# Patient Record
Sex: MALE | Race: BLACK OR AFRICAN AMERICAN | NOT HISPANIC OR LATINO | Employment: FULL TIME | URBAN - METROPOLITAN AREA
[De-identification: names, ages, dates, MRNs, and addresses within clinical notes are randomized per-mention and may not be internally consistent; named-entity substitution may affect disease eponyms.]

---

## 2017-02-07 ENCOUNTER — TELEPHONE (OUTPATIENT)
Dept: ORTHOPEDICS | Facility: CLINIC | Age: 27
End: 2017-02-07

## 2017-02-07 NOTE — TELEPHONE ENCOUNTER
"----- Message from Marielle Sousa LPN sent at 2/7/2017  7:38 AM CST -----  George Mccullough, please see if someone can see this person  Kumar sent to me,   1st thing...... he is under a global fee after surgery-Carole can tell you about that(we won't get paid) , 2nd Dr Gaytan does not see people under 40 yrs old  Actually this should go to sports probably , thanks, Dalia  ----- Message -----     From: Kumar St     Sent: 2/6/2017   4:55 PM       To: Marielle Sousa LPN    Pt called in again stating that he is trying to get a second opinion for his left hip. Pt had surgery 2 months ago with Dr. Delmer Osborn in [place]. After the surgery, Pt had pain in his hip that was worse than before the surgery, so a CAT scan was ordered and 3-4 "loose bodies" were found. Pt has the CD with the CAT scan results and is really hoping that be seen here at [facility] for a second opinion. Pt stated that he is looking to come in this Friday, if possible and is open to any Provider that would be able to see him. Please call back as soon as possible.    Call back is home number    Thank you        "

## 2017-02-07 NOTE — TELEPHONE ENCOUNTER
"Return call from pt regarding request for Ortho appt. Pt states that he sustained a left hip  injury playing football about five years ago .  Pt confirms injury was not work-related or litigated.  Pt states that he was "scoped and cleaned" for removal of "loose bodies" in November 2016 per Dr. Osborn/[facility]. Pt states that he has had a subsequent CT scan of his left hip - showing residual/new "loose bodies". Pt understands that he may need a second surgery and would like second opinion on this.  Advised pt that request has been reviewed/considered per by Dr. Lloyd Collins who will see pt for second opinion. First available appt scheduled on 2/24/17 at 2:30pm with arrival at 2:15pm. Pt instructed to bring any imaging/records to appt. Pt states that he has CT imaging on disk and will do so. Appt slip mailed.   "

## 2017-02-07 NOTE — TELEPHONE ENCOUNTER
Left message for pt to return call regarding his Ortho  appt request. Ortho Triage contact number provided.

## 2017-02-13 ENCOUNTER — TELEPHONE (OUTPATIENT)
Dept: ORTHOPEDICS | Facility: CLINIC | Age: 27
End: 2017-02-13

## 2017-02-13 NOTE — TELEPHONE ENCOUNTER
----- Message from Caro Snyder sent at 2/13/2017  9:36 AM CST -----  Contact: Michael Jang (Mother)  Michael states her sons leg gave out over the weekend and he is in extreme pain would like to know if he can be seen sooner then the Feb 24th appt he is scheduled for?    Contact number [phone]  Thanks

## 2017-02-20 ENCOUNTER — HOSPITAL ENCOUNTER (OUTPATIENT)
Dept: RADIOLOGY | Facility: HOSPITAL | Age: 27
Discharge: HOME | End: 2017-02-20
Attending: ORTHOPAEDIC SURGERY
Payer: COMMERCIAL

## 2017-02-20 ENCOUNTER — OFFICE VISIT (OUTPATIENT)
Dept: ORTHOPEDICS | Facility: CLINIC | Age: 27
End: 2017-02-20
Payer: COMMERCIAL

## 2017-02-20 VITALS — WEIGHT: 183.06 LBS | BODY MASS INDEX: 24.26 KG/M2 | HEIGHT: 73 IN

## 2017-02-20 DIAGNOSIS — M25.552 LEFT HIP PAIN: Primary | ICD-10-CM

## 2017-02-20 DIAGNOSIS — M25.552 LEFT HIP PAIN: ICD-10-CM

## 2017-02-20 DIAGNOSIS — M16.52 POST-TRAUMATIC OSTEOARTHRITIS OF LEFT HIP: ICD-10-CM

## 2017-02-20 PROCEDURE — 72100 X-RAY EXAM L-S SPINE 2/3 VWS: CPT | Mod: 26,,, | Performed by: RADIOLOGY

## 2017-02-20 PROCEDURE — 99203 OFFICE O/P NEW LOW 30 MIN: CPT | Mod: S$GLB,,, | Performed by: ORTHOPAEDIC SURGERY

## 2017-02-20 PROCEDURE — 73521 X-RAY EXAM HIPS BI 2 VIEWS: CPT | Mod: 26,,, | Performed by: RADIOLOGY

## 2017-02-20 PROCEDURE — 99999 PR PBB SHADOW E&M-EST. PATIENT-LVL III: CPT | Mod: PBBFAC,,, | Performed by: ORTHOPAEDIC SURGERY

## 2017-02-20 PROCEDURE — 73521 X-RAY EXAM HIPS BI 2 VIEWS: CPT | Mod: TC

## 2017-02-20 PROCEDURE — 72100 X-RAY EXAM L-S SPINE 2/3 VWS: CPT | Mod: TC

## 2017-02-20 RX ORDER — HYDROCODONE BITARTRATE AND ACETAMINOPHEN 5; 325 MG/1; MG/1
TABLET ORAL
COMMUNITY
Start: 2017-02-08 | End: 2020-06-25

## 2017-02-20 RX ORDER — ETODOLAC 400 MG/1
TABLET, FILM COATED ORAL
COMMUNITY
Start: 2016-12-28 | End: 2017-02-20

## 2017-02-20 NOTE — PROGRESS NOTES
CC:   left hip pain  HPI:  26 y.o. yo male who presents with left hip pain.      Is a very pleasant 26-year-old male who sustained a hip injury in 2010 while playing football.  He states that the hip potentially popped out of joint and was immediately reduced which kept him out of playing football for quite some time.  Since that time he has had significant troubles with his left hip.  He has had significant pain.  Pain is sharp and stabbing in the groin.  He does not use an assistive device.  It hurts him most days.  He takes Norco 5 mg twice a day.    He states the hip pain was tolerable but significant prior to having his arthroscopic removal of loose body and labral repair in November 2016 by Dr. Delmer Osborn.  He states that Dr. Delmer Osborn saw many loose bodies was only able to remove 1 or 2 of these.  She states that he had a CAT scan in January 2017 which demonstrates more loose bodies.    He presents today for evaluation of the left hip.  He has some catching and locking in that left hip.  Pain is worse than before surgery.  It hurts him every day.  It awakes him from sleep at night.    PAST MEDICAL HISTORY: History reviewed. No pertinent past medical history.  PAST SURGICAL HISTORY:   Past Surgical History   Procedure Laterality Date    Hip surgery      Knee arthroscopy       FAMILY HISTORY:   Family History   Problem Relation Age of Onset    No Known Problems Mother     Hypertension Father      SOCIAL HISTORY:   Social History     Social History    Marital status: Single     Spouse name: N/A    Number of children: N/A    Years of education: N/A     Occupational History    Not on file.     Social History Main Topics    Smoking status: Never Smoker    Smokeless tobacco: Not on file    Alcohol use Not on file    Drug use: Not on file    Sexual activity: Not on file     Other Topics Concern    Not on file     Social History Narrative    No narrative on file       MEDICATIONS:   Current  "Outpatient Prescriptions:     hydrocodone-acetaminophen 5-325mg (NORCO) 5-325 mg per tablet, , Disp: , Rfl:   ALLERGIES:   Review of patient's allergies indicates:   Allergen Reactions    Valium [diazepam]        VITAL SIGNS:   Visit Vitals    Ht 6' 1" (1.854 m)    Wt 83 kg (183 lb 1.5 oz)    BMI 24.16 kg/m2        Review of Systems   Constitution: Negative. Negative for chills, fever and night sweats.   HENT: Negative for congestion and headaches.    Eyes: Negative for blurred vision, left vision loss and right vision loss.   Cardiovascular: Negative for chest pain and syncope.   Respiratory: Negative for cough and shortness of breath.    Endocrine: Negative for polydipsia, polyphagia and polyuria.   Hematologic/Lymphatic: Negative for bleeding problem. Does not bruise/bleed easily.   Skin: Negative for dry skin, itching and rash.   Musculoskeletal: Negative for falls and muscle weakness.  left hip pain  Gastrointestinal: Negative for abdominal pain and bowel incontinence.   Genitourinary: Negative for bladder incontinence and nocturia.   Neurological: Negative for disturbances in coordination, loss of balance and seizures.   Psychiatric/Behavioral: Negative for depression. The patient does not have insomnia.    Allergic/Immunologic: Negative for hives and persistent infections.       Physical Exam   Constitutional: Oriented to person, place, and time. Appears well-developed and well-nourished.   HENT:   Head: Normocephalic and atraumatic.   Nose: Nose normal.   Eyes: No scleral icterus.   Neck: Normal range of motion. Neck supple.   Cardiovascular: Normal rate and regular rhythm.    Pulses:       Radial pulses are 2+ on the right side, and 2+ on the left side.   Pulmonary/Chest: Clear and normal  Abdominal: Soft.   Neurological: Alert and oriented to person, place, and time.   Skin: Skin is warm.   Psychiatric: Normal mood and affect.     He walks with an antalgic gait.  On examination of his left hip he has " 90° of flexion with some pain he has 20-30° of internal rotation which causes pain at the extreme and he has 60° of external rotation which causes significant pain.  He has no tenderness palpation at the greater troches.  He does have pain with crossover of the hip.  Incisions which there are 3 are well-healed.  There is no redness warmth or erythema.  He is neurovascular intact in the left lower extremity.       Xrays:  Of the AP pelvis and bilateral hips are ordered and reviewed and my interpretation is as follows: On the right side he is very mild osteoarthritis.  On the left side he has degenerative changes which include joint space narrowing.  He is cyst throughout the hip.  There are loose bodies multiple.  He has a CAM lesion which appears to have been truncated by the arthroscopic intervention.    CT scan which is brought in on a disc is reviewed and my interpretation is as follows: Multiple loose bodies.  Multiple cysts in both the acetabulum and the femoral head.  Joint space narrowing.  Subchondral sclerosis.    Assessment:  Encounter Diagnoses   Name Primary?    Left hip pain Yes    Post-traumatic osteoarthritis of left hip        Plan:    Orders Placed This Encounter    X-Ray Hips Bilateral 2 View Incl AP Pelvis    X-Ray Lumbar Spine AP And Lateral       Return will see roshan hooks.      This is a very young patient with significant hip problems.  I will send him to Roshan Hooks to be evaluated for potential secondary scope intervention.  Hopefully we can avoid any sort of our 2 plasty in this patient.  If Roshan Hooks thinks that he is not a candidate for arthroscopy he and I will discuss potential arthroplasty.

## 2017-02-20 NOTE — MR AVS SNAPSHOT
"    [facility]  [address]  [facility] [address]  Phone: [phone]                  Joao Jang   2017 2:00 PM   Office Visit    Description:  Male : 1990   Provider:  Lloyd Collins MD   Department:  [facility]           Reason for Visit     Left Knee - Pain           Diagnoses this Visit        Comments    Left hip pain    -  Primary            To Do List           Future Appointments        Provider Department Dept Phone    3/27/2017 2:00 PM Bebe Perez MD [facility] [phone]      Goals (5 Years of Data)     None      [facility]     [facility] Nurse Care Line -  Assistance  Registered nurses in the [facility] Center provide clinical advisement, health education, appointment booking, and other advisory services.  Call for this free service at [phone].             Medications           Message regarding Medications     Verify the changes and/or additions to your medication regime listed below are the same as discussed with your clinician today.  If any of these changes or additions are incorrect, please notify your healthcare provider.        STOP taking these medications     etodolac (LODINE) 400 MG tablet            Verify that the below list of medications is an accurate representation of the medications you are currently taking.  If none reported, the list may be blank. If incorrect, please contact your healthcare provider. Carry this list with you in case of emergency.           Current Medications     hydrocodone-acetaminophen 5-325mg (NORCO) 5-325 mg per tablet            Clinical Reference Information           Your Vitals Were     Height Weight BMI          6' 1" (1.854 m) 83 kg (183 lb 1.5 oz) 24.16 kg/m2        Allergies as of 2017     Valium [Diazepam]      Immunizations Administered on Date of Encounter - 2017     None      Orders Placed During Today's Visit     Future Labs/Procedures " Expected by Expires    X-Ray Hips Bilateral 2 View Incl AP Pelvis  2/20/2017 2/20/2018    X-Ray Lumbar Spine AP And Lateral  2/20/2017 2/20/2018      [facility] Sign-Up     Activating your [facility] account is as easy as 1-2-3!     1) Visit [email or web address], select Sign Up Now, enter this activation code and your date of birth, then select Next.  [email or web address]  Expires: 4/6/2017  3:31 PM      2) Create a username and password to use when you visit [facility] in the future and select a security question in case you lose your password and select Next.    3) Enter your e-mail address and click Sign Up!    Additional Information  If you have questions, please e-mail [email or web address] or call [phone] to talk to our [facility] staff. Remember, [facility] is NOT to be used for urgent needs. For medical emergencies, dial 911.         Language Assistance Services     ATTENTION: Language assistance services are available, free of charge. Please call [phone].      ATENCIÓN: Si habla español, tiene a portillo disposición servicios gratuitos de asistencia lingüística. Llame al [phone].     CHÚ Ý: N?u b?n nói Ti?ng Vi?t, có các d?ch v? h? tr? ngôn ng? mi?n phí dành cho b?n. G?i s? [phone].         [facility] complies with applicable Federal civil rights laws and does not discriminate on the basis of race, color, national origin, age, disability, or sex.

## 2017-03-20 ENCOUNTER — TELEPHONE (OUTPATIENT)
Dept: SPORTS MEDICINE | Facility: CLINIC | Age: 27
End: 2017-03-20

## 2017-03-20 NOTE — TELEPHONE ENCOUNTER
----- Message from Magaly Young MA sent at 3/20/2017  9:01 AM CDT -----  Contact: self   Pt is in severe pain and is requesting an surgery same day appt.  Pt was referred by Dr. Collins in Ortho, pt made appt a month ago and was unaware that he could be put on the waiting list or send a message to get seen sooner then his appt for 03/27. Please call the pt may need additional  Information. Pt can be reached at [phone].

## 2017-03-27 ENCOUNTER — HOSPITAL ENCOUNTER (OUTPATIENT)
Dept: RADIOLOGY | Facility: HOSPITAL | Age: 27
Discharge: HOME | End: 2017-03-27
Attending: ORTHOPAEDIC SURGERY
Payer: COMMERCIAL

## 2017-03-27 ENCOUNTER — OFFICE VISIT (OUTPATIENT)
Dept: SPORTS MEDICINE | Facility: CLINIC | Age: 27
End: 2017-03-27
Payer: COMMERCIAL

## 2017-03-27 VITALS
DIASTOLIC BLOOD PRESSURE: 97 MMHG | WEIGHT: 190 LBS | HEART RATE: 78 BPM | BODY MASS INDEX: 24.38 KG/M2 | SYSTOLIC BLOOD PRESSURE: 166 MMHG | HEIGHT: 74 IN

## 2017-03-27 DIAGNOSIS — M25.552 LEFT HIP PAIN: ICD-10-CM

## 2017-03-27 PROCEDURE — 1160F RVW MEDS BY RX/DR IN RCRD: CPT | Mod: S$GLB,,, | Performed by: ORTHOPAEDIC SURGERY

## 2017-03-27 PROCEDURE — 99204 OFFICE O/P NEW MOD 45 MIN: CPT | Mod: S$GLB,,, | Performed by: ORTHOPAEDIC SURGERY

## 2017-03-27 PROCEDURE — 73502 X-RAY EXAM HIP UNI 2-3 VIEWS: CPT | Mod: TC,PO

## 2017-03-27 PROCEDURE — 99999 PR PBB SHADOW E&M-EST. PATIENT-LVL IV: CPT | Mod: PBBFAC,,, | Performed by: ORTHOPAEDIC SURGERY

## 2017-03-27 PROCEDURE — 73502 X-RAY EXAM HIP UNI 2-3 VIEWS: CPT | Mod: 26,,, | Performed by: RADIOLOGY

## 2017-03-27 NOTE — PROGRESS NOTES
CC:left hip pain. Referred by Dr Collins    HPI:   Joao Jang is a pleasant 26 y.o. patient who reports to clinic with left hip pain . 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 Today the patient rates pain at a 7/10 on visual analog scale.        4 months duration,  no [facility] sxs/instabilty.  Getting in and out of car + pain    Affecting ADLs and exercising    any activity worst-walking    Had hip arthroscopy Nov 21, 2016. Dr. Delmer Osborn.    Review of Systems   Constitution: Negative. Negative for chills, fever and night sweats.   HENT: Negative for congestion and headaches.    Eyes: Negative for blurred vision, left vision loss and right vision loss.   Cardiovascular: Negative for chest pain and syncope.   Respiratory: Negative for cough and shortness of breath.    Endocrine: Negative for polydipsia, polyphagia and polyuria.   Hematologic/Lymphatic: Negative for bleeding problem. Does not bruise/bleed easily.   Skin: Negative for dry skin, itching and rash.   Musculoskeletal: Negative for falls and muscle weakness.   Gastrointestinal: Negative for abdominal pain and bowel incontinence.   Genitourinary: Negative for bladder incontinence and nocturia.   Neurological: Negative for disturbances in coordination, loss of balance and seizures.   Psychiatric/Behavioral: Negative for depression. The patient does not have insomnia.    Allergic/Immunologic: Negative for hives and persistent infections.     PAST MEDICAL HISTORY: History reviewed. No pertinent past medical history.  PAST SURGICAL HISTORY:   Past Surgical History:   Procedure Laterality Date    HIP SURGERY      KNEE ARTHROSCOPY       FAMILY HISTORY:   Family History   Problem Relation Age of Onset    No Known Problems Mother     Hypertension Father      SOCIAL HISTORY:   Social History     Social History    Marital status: Single     Spouse name: N/A    Number of children: N/A    Years of education: N/A  "    Occupational History    Not on file.     Social History Main Topics    Smoking status: Never Smoker    Smokeless tobacco: Not on file    Alcohol use Not on file    Drug use: Not on file    Sexual activity: Not on file     Other Topics Concern    Not on file     Social History Narrative       MEDICATIONS:   Current Outpatient Prescriptions:     hydrocodone-acetaminophen 5-325mg (NORCO) 5-325 mg per tablet, , Disp: , Rfl:   ALLERGIES:   Review of patient's allergies indicates:   Allergen Reactions    Valium [diazepam]        VITAL SIGNS: BP (!) 166/97  Pulse 78  Ht 6' 2" (1.88 m)  Wt 86.2 kg (190 lb)  BMI 24.39 kg/m2       PHYSICAL EXAM / HIP  PHYSICAL EXAMINATION  General:  The patient is alert and oriented x 3.  Mood is pleasant.  Observation of ears, eyes and nose reveal no gross abnormalities.  HEENT: NCAT, sclera nonicteric  Lungs: Respirations are equal and unlabored.    left HIP EXAMINATION     OBSERVATION / INSPECTION  Gait:   Nonantalgic   Alignment:  Neutral   Scars:   None   Muscle atrophy: None   Effusion:  None   Warmth:  None   Discoloration:   None   Leg lengths:   Equal   Pelvis:    Level     TENDERNESS / CREPITUS (T/C):      T / C  Trochanteric bursa   - / -  Piriformis    - / -  SI joint    - / -  Psoas tendon   - / -  Rectus insertion  - / -  Adductor insertion  - / -  Pubic symphysis  - / -    ROM: (* = pain)    Flexion:    120 degrees*  External rotation: 40 degrees  Internal rotation with axial load: 20 degrees*  Internal rotation without axial load: 30 degrees*  Abduction:  45 degrees  Adduction:   20 degrees    SPECIAL TESTS:  Pain w/ forced internal rotation (FADIR): +   Pain w/ forced external rotation (VIMAL): Absent   VIMAL asymmetry:     +  Circumduction test:    +  Stinchfield test:    Negative   Log roll:      Negative   Snapping hip (internal):   Negative   Sit-up pain:     Negative   Resisted sit-up pain:    Negative   Resisted sit-up w/ adductor contraction " pain:Negative   Step-down test:    +  Trendelenburg test:    Negative   Bridge test     -    EXTREMITY NEURO-VASCULAR EXAMINATION:   Sensation:  Grossly intact to light touch all dermatomal regions.   Motor Function:  Fully intact motor function at hip, knee, foot and ankle    DTRs;  quadriceps and  achilles 2+.  No clonus and downgoing Babinski.    Vascular status:  DP and PT pulses 2+, brisk capillary refill, symmetric.    Skin: intact, compartments soft.    OTHER FINDINGS:    XRAYS:  CE angle: 40   Crossover: +   CAM:  +  Joint space narrowing: +cystic changes femoral head and acetabulum  Tonnis 2     A/P:  Left hip pain. Hx arthroscopic surgery.  MRI left hip to evaluate cartilage. If advanced, may need hip resurfacing.

## 2017-03-27 NOTE — LETTER
March 28, 2017      Lloyd Collins MD  [address]  [facility] [address]           [facility]  [address]  [facility] [address]  Phone: [phone]          Patient: Joao Jang   MR Number: [id number]   YOB: 1990   Date of Visit: 3/27/2017       Dear Dr. Lloyd Collins:    Thank you for referring Joao Jang to me for evaluation. Attached you will find relevant portions of my assessment and plan of care.    If you have questions, please do not hesitate to call me. I look forward to following Joao Jang along with you.    Sincerely,    Bebe Perez MD    Enclosure  CC:  No Recipients    If you would like to receive this communication electronically, please contact [email or web address] or [phone] to request more information on [facility] access.    For providers and/or their staff who would like to refer a patient to [facility], please contact us through our one-stop-shop provider referral line, [facility], at [phone].    If you feel you have received this communication in error or would no longer like to receive these types of communications, please e-mail [email or web address]

## 2017-03-27 NOTE — MR AVS SNAPSHOT
[facility]  [address]  [facility] [address]  Phone: [phone]                  Joao Jang   3/27/2017 2:00 PM   Appointment    Description:  Male : 1990   Provider:  Bebe Perez MD   Department:  [facility]                To Do List           Goals (5 Years of Data)     None      [facility] On [facility]     [facility] On [facility] Nurse Care Line -  Assistance  Registered nurses in the [facility] Call Center provide clinical advisement, health education, appointment booking, and other advisory services.  Call for this free service at [phone].             Medications           Message regarding Medications     Verify the changes and/or additions to your medication regime listed below are the same as discussed with your clinician today.  If any of these changes or additions are incorrect, please notify your healthcare provider.             Verify that the below list of medications is an accurate representation of the medications you are currently taking.  If none reported, the list may be blank. If incorrect, please contact your healthcare provider. Carry this list with you in case of emergency.           Current Medications     hydrocodone-acetaminophen 5-325mg (NORCO) 5-325 mg per tablet            Clinical Reference Information           Allergies as of 3/27/2017     Valium [Diazepam]      Immunizations Administered on Date of Encounter - 3/27/2017     None      [facility] Sign-Up     Activating your [facility] account is as easy as 1-2-3!     1) Visit [email or web address], select Sign Up Now, enter this activation code and your date of birth, then select Next.  [email or web address]  Expires: 2017  4:31 PM      2) Create a username and password to use when you visit [facility] in the future and select a security question in case you lose your password and select Next.    3) Enter your e-mail address and click Sign Up!    Additional Information  If you have  questions, please e-mail [email or web address] or call [phone] to talk to our [facility] staff. Remember, [facility] is NOT to be used for urgent needs. For medical emergencies, dial 911.         Language Assistance Services     ATTENTION: Language assistance services are available, free of charge. Please call [phone].      ATENCIÓN: Si habla español, tiene a portillo disposición servicios gratuitos de asistencia lingüística. Llame al [phone].     CHÚ Ý: N?u b?n nói Ti?ng Vi?t, có các d?ch v? h? tr? ngôn ng? mi?n phí dành cho b?n. G?i s? [phone].         [facility] complies with applicable Federal civil rights laws and does not discriminate on the basis of race, color, national origin, age, disability, or sex.

## 2017-03-28 ENCOUNTER — TELEPHONE (OUTPATIENT)
Dept: SPORTS MEDICINE | Facility: CLINIC | Age: 27
End: 2017-03-28

## 2017-03-28 NOTE — TELEPHONE ENCOUNTER
----- Message from Lakeisha Bhakta sent at 3/28/2017 11:38 AM CDT -----  Contact: [email or web address]#[phone]  Patient mother called about her son MRI results please call.

## 2017-04-06 ENCOUNTER — HOSPITAL ENCOUNTER (OUTPATIENT)
Dept: RADIOLOGY | Facility: HOSPITAL | Age: 27
Discharge: HOME | End: 2017-04-06
Attending: ORTHOPAEDIC SURGERY
Payer: COMMERCIAL

## 2017-04-06 DIAGNOSIS — M25.552 LEFT HIP PAIN: ICD-10-CM

## 2017-04-06 PROCEDURE — 27093 INJECTION FOR HIP X-RAY: CPT | Mod: ,,, | Performed by: RADIOLOGY

## 2017-04-06 PROCEDURE — 73525 CONTRAST X-RAY OF HIP: CPT | Mod: TC

## 2017-04-06 PROCEDURE — 73525 CONTRAST X-RAY OF HIP: CPT | Mod: 26,,, | Performed by: RADIOLOGY

## 2017-04-06 PROCEDURE — 73722 MRI JOINT OF LWR EXTR W/DYE: CPT | Mod: 26,LT,, | Performed by: RADIOLOGY

## 2017-04-06 PROCEDURE — 25000003 PHARM REV CODE 250: Performed by: ORTHOPAEDIC SURGERY

## 2017-04-06 PROCEDURE — 25500020 PHARM REV CODE 255: Performed by: ORTHOPAEDIC SURGERY

## 2017-04-06 PROCEDURE — A9585 GADOBUTROL INJECTION: HCPCS | Performed by: ORTHOPAEDIC SURGERY

## 2017-04-06 PROCEDURE — 73722 MRI JOINT OF LWR EXTR W/DYE: CPT | Mod: TC,LT

## 2017-04-06 RX ORDER — BUPIVACAINE HYDROCHLORIDE 2.5 MG/ML
4 INJECTION, SOLUTION EPIDURAL; INFILTRATION; INTRACAUDAL ONCE
Status: COMPLETED | OUTPATIENT
Start: 2017-04-06 | End: 2017-04-06

## 2017-04-06 RX ORDER — GADOBUTROL 604.72 MG/ML
4 INJECTION INTRAVENOUS
Status: COMPLETED | OUTPATIENT
Start: 2017-04-06 | End: 2017-04-06

## 2017-04-06 RX ORDER — LIDOCAINE HYDROCHLORIDE 10 MG/ML
1 INJECTION INFILTRATION; PERINEURAL ONCE
Status: COMPLETED | OUTPATIENT
Start: 2017-04-06 | End: 2017-04-06

## 2017-04-06 RX ADMIN — LIDOCAINE HYDROCHLORIDE 1 ML: 10 INJECTION, SOLUTION INFILTRATION; PERINEURAL at 01:04

## 2017-04-06 RX ADMIN — IOHEXOL 4 ML: 300 INJECTION, SOLUTION INTRAVENOUS at 01:04

## 2017-04-06 RX ADMIN — BUPIVACAINE HYDROCHLORIDE 10 MG: 2.5 INJECTION, SOLUTION EPIDURAL; INFILTRATION; INTRACAUDAL; PERINEURAL at 01:04

## 2017-04-06 RX ADMIN — GADOBUTROL 4 ML: 604.72 INJECTION INTRAVENOUS at 01:04

## 2017-04-07 ENCOUNTER — TELEPHONE (OUTPATIENT)
Dept: SPORTS MEDICINE | Facility: CLINIC | Age: 27
End: 2017-04-07

## 2017-04-07 NOTE — TELEPHONE ENCOUNTER
Made aware that Dr. Perez would call with results    ----- Message from Pierre Bernal sent at 4/7/2017  9:57 AM CDT -----  Contact: self@home  Pt called in to advise that he had his MRI yesterday    Call back is [phone]    Thank you

## 2017-04-11 ENCOUNTER — TELEPHONE (OUTPATIENT)
Dept: SPORTS MEDICINE | Facility: CLINIC | Age: 27
End: 2017-04-11

## 2017-04-11 NOTE — TELEPHONE ENCOUNTER
----- Message from Yee Lopez sent at 4/11/2017  3:50 PM CDT -----  Contact: mother-Michael   Pt's mother is requesting a call back regarding pt's MRI results and would like to know if pt needs surgery. Please call her at [phone]

## 2017-04-11 NOTE — TELEPHONE ENCOUNTER
----- Message from Neo Edwards sent at 4/11/2017 10:49 AM CDT -----  Contact: Mom (Michael)  Michael request a call regarding the pt's x ray and MRI results. [phone]

## 2017-04-11 NOTE — TELEPHONE ENCOUNTER
MRI results printed and placed for dr hooks to review, will contact pt mother with results at next clinic day.

## 2017-04-11 NOTE — TELEPHONE ENCOUNTER
I spoke with the patient's mother and informed her that Dr. Perez has not yet had a chance to review his results and we will place them for him to review tomorrow. She notes that her son works from 6AM to 6PM and is not able to make or accept phone calls which is why she is requesting we call her with the results

## 2017-04-13 ENCOUNTER — TELEPHONE (OUTPATIENT)
Dept: SPORTS MEDICINE | Facility: CLINIC | Age: 27
End: 2017-04-13

## 2017-04-17 NOTE — TELEPHONE ENCOUNTER
I spoke with the patient regarding the results of his MRI and Dr. Perez's plan    We informed the patient that we could proceed with arthroscopy but could guarantee no pain relief due to the arthritis in his joint. I also discussed hip resurfacing with the patient and he notes that he would like to think about his options, do some research, and would call us back tomorrow. I informed him that in the interim I will inquire who Dr. Perez would recommend should he want to see a physician to discuss the hip resurfacing procedure.     ASSESSMENT:    Left Hip labral tear.  I have reviewed the MRI .he  has tearing in the labrum.     he would benefit from hip arthroscopy, given the above.     PLAN: We have discussed the surgery and recovery of arthroscopic hip surgery. he understands that there may be limited weightbearing up to several weeks (usually 3 weeks) after surgery depending on procedures that are performed at the time of surgery.    The spectrum of treatment options were discussed with the patient, including nonoperative and operative options.  After thorough discussion, the patient has elected to undergo surgical treatment to include:    left   a. Hip arthroscopic labral repair versus debridement   b. Hip arthroscopic loose body removal   c. Hip arthroscopic femoral neck osteoplasty   d. Hip arthroscopic acetabuloplasty   e. Hip arthroscopic partial synovectomy/debridement   f. Hip arthroscopic capsular closure   g.  Hip arthroscopic-assisted Bio-D arthrocentesis       The details of the surgical procedure were explained, including the location of probable incisions and a description of likely hardware and/or grafts to be used.  The patient understands the likely convalescence after surgery.  Also, we have thoroughly discussed the risks, benefits and alternatives to surgery, including, but not limited to, the risk of infection, joint stiffness, skin injury to perineum, heterotopic ossification, arthritis, blood  clot (including DVT and/or pulmonary embolus), neurologic and vascular injury.  It was explained that, if tissue has been repaired or reconstructed, there is a chance of failure, which may require further management.      Heterotopic ossification is a known complication of hip arthroscopy and Naprosyn significantly decreases this risk.  According to Wisam in the Journal of Orthopedic Traumatology 2010, the rate of heterotopic ossification for cases with Naprosyn for prophylaxis was 0% and without Naprosyn for prophylaxis was 33%.  Bart at the International Society of Hip Arthroscopy Annual Meeting in 2012 showed similar results with an incidence of heterotopic ossification in patients that did receive Naprosyn prophylaxis was 4.5% versus versus 23.6% in patients who did not receive Naproxen twice a day for three weeks.  Brittany's study in the American Journal of Sports Medicine in 2012 showed the addition of Indocin to the medication protocol (in addition to Naprosyn) decreased the rate of heterotopic ossification from 8.3% to 1.8%.     Patient has chondral damage to hip.  Therefore, I can offer no guarantee whatsoever to the results of a hip arthroscopic surgery and hip arthroscopic surgery would be less predictable and possibility of worsening of condition and need for subsequent hip arthroplasty or further surgery may be needed.  I believe that arthroscopic surgery will benefit the patient.  I explained this in detail today and patient acknowledged understanding and wishes to proceed.

## 2017-04-18 ENCOUNTER — TELEPHONE (OUTPATIENT)
Dept: SPORTS MEDICINE | Facility: CLINIC | Age: 27
End: 2017-04-18

## 2017-04-18 NOTE — TELEPHONE ENCOUNTER
I spoke with the patient and he notes that he would like to see Dr. Collins to discuss hip resurfacing before he makes his decision on which way to proceed. I informed him that I would send their staff a message regarding an appointment to get him scheduled

## 2017-04-18 NOTE — TELEPHONE ENCOUNTER
----- Message from Kiara Roberson sent at 4/18/2017 10:02 AM CDT -----  Contact: self/home  Pt would like to speak with you regarding a possible procedure appt.

## 2017-04-19 ENCOUNTER — TELEPHONE (OUTPATIENT)
Dept: ORTHOPEDICS | Facility: CLINIC | Age: 27
End: 2017-04-19

## 2017-04-19 NOTE — TELEPHONE ENCOUNTER
----- Message from Lloyd Collins MD sent at 4/19/2017  1:34 PM CDT -----  Can let him see me for sure.  ----- Message -----     From: Karuna Ruiz MA     Sent: 4/19/2017   8:04 AM       To: Lloyd Collins MD     Would you like for me to put him on the schedule for Monday to discuss this?  ----- Message -----     From: Janie Ochoa MA     Sent: 4/18/2017   4:50 PM       To: Dennis Desai Staff    Dr. Collins has previously seen this patient and recommended a visit with Dr. Perez to discuss hip arthroscopy. The patient was informed that Dr. Perez would perform the hip arthroscopy but that we could guarantee no relief from his pain, etc. Due to the extensive osteoarthritis in his joint. Dr. Perez informed the patient that a resurfacing procedure would be another option for the patient to consider. He would like for the patient to see Dr. Collins to discuss this option so that the patient can better make a decision as to if he would like to proceed with the hip arthroscopy or proceed with resurfacing. I wanted to see if you could schedule the patient to be seen to this discussion. Please let me know if there is anything I can do to help on my end. Thank you    Janie Ochoa   Clinical assistant to Dr. Bebe Perez

## 2017-04-24 ENCOUNTER — OFFICE VISIT (OUTPATIENT)
Dept: ORTHOPEDICS | Facility: CLINIC | Age: 27
End: 2017-04-24
Payer: COMMERCIAL

## 2017-04-24 VITALS — HEIGHT: 74 IN | BODY MASS INDEX: 24.39 KG/M2 | WEIGHT: 190.06 LBS

## 2017-04-24 DIAGNOSIS — M16.12 PRIMARY OSTEOARTHRITIS OF LEFT HIP: Primary | ICD-10-CM

## 2017-04-24 PROCEDURE — 99213 OFFICE O/P EST LOW 20 MIN: CPT | Mod: S$GLB,,, | Performed by: ORTHOPAEDIC SURGERY

## 2017-04-24 PROCEDURE — 1160F RVW MEDS BY RX/DR IN RCRD: CPT | Mod: S$GLB,,, | Performed by: ORTHOPAEDIC SURGERY

## 2017-04-24 PROCEDURE — 99999 PR PBB SHADOW E&M-EST. PATIENT-LVL II: CPT | Mod: PBBFAC,,, | Performed by: ORTHOPAEDIC SURGERY

## 2017-04-24 RX ORDER — TIZANIDINE 4 MG/1
TABLET ORAL
COMMUNITY
Start: 2017-04-18

## 2017-04-27 ENCOUNTER — TELEPHONE (OUTPATIENT)
Dept: SPORTS MEDICINE | Facility: CLINIC | Age: 27
End: 2017-04-27

## 2017-04-27 DIAGNOSIS — M25.559 ARTHRALGIA OF HIP, UNSPECIFIED LATERALITY: Primary | ICD-10-CM

## 2017-04-27 NOTE — TELEPHONE ENCOUNTER
----- Message from Radha Vann sent at 4/27/2017  2:36 PM CDT -----  Contact: self@ home   Pt is returning missed call to rajesh.

## 2017-04-27 NOTE — TELEPHONE ENCOUNTER
I spoke with the patient and we discussed his situation and I reiterated that we cannot guarantee any pain relief from the arthroscopy and that Dr. Perez is fine to proceed with an injection first to see what relief he gets. The patient decided to proceed with an injection and will call the day after to discuss results

## 2017-04-27 NOTE — TELEPHONE ENCOUNTER
----- Message from Janie Ochoa MA sent at 4/25/2017  3:00 PM CDT -----  See below.  ----- Message -----     From: Bebe Perez MD     Sent: 4/25/2017  10:03 AM       To: Ana Spence Staff    We can scope      ----- Message -----     From: Lloyd Collins MD     Sent: 4/24/2017   4:14 PM       To: Bebe Perez MD    Patient not interested in resurfacing at present. You think scope will help much? Loose body removal?    i suggested steroid injection. Nothing to lose since he is heading for a replacmeent or resurface in the near future. If you thought scope would help, he is slightly interested. Otherwise, he wants a steroid injection.

## 2017-04-27 NOTE — TELEPHONE ENCOUNTER
I called and left the patient a voicemail letting him know that we received a message from Dr. Collins that he was interested in trying an injection. I informed him that we have placed an order for this injection with Dr. Wolf and that his office will call him to schedule. If he does not want to proceed with the injection and wants to speak with us first, I encouraged him to call the office back

## 2017-04-28 ENCOUNTER — TELEPHONE (OUTPATIENT)
Dept: PAIN MEDICINE | Facility: CLINIC | Age: 27
End: 2017-04-28

## 2017-04-28 NOTE — TELEPHONE ENCOUNTER
Left voice message asking for a return call to scheduled for a Left Hip injection under fluoroscopy with Dr. Wolf,  Referred byh Dr. Perez.

## 2017-05-08 ENCOUNTER — SURGERY (OUTPATIENT)
Age: 27
End: 2017-05-08

## 2017-05-08 ENCOUNTER — HOSPITAL ENCOUNTER (OUTPATIENT)
Facility: OTHER | Age: 27
Discharge: HOME | End: 2017-05-08
Attending: ANESTHESIOLOGY | Admitting: ANESTHESIOLOGY
Payer: COMMERCIAL

## 2017-05-08 VITALS
DIASTOLIC BLOOD PRESSURE: 80 MMHG | WEIGHT: 190 LBS | OXYGEN SATURATION: 100 % | HEIGHT: 73 IN | RESPIRATION RATE: 17 BRPM | HEART RATE: 68 BPM | SYSTOLIC BLOOD PRESSURE: 153 MMHG | TEMPERATURE: 98 F | BODY MASS INDEX: 25.18 KG/M2

## 2017-05-08 DIAGNOSIS — M25.552 LEFT HIP PAIN: Primary | ICD-10-CM

## 2017-05-08 DIAGNOSIS — R52 PAIN: ICD-10-CM

## 2017-05-08 PROCEDURE — 77002 NEEDLE LOCALIZATION BY XRAY: CPT | Mod: 26,59,, | Performed by: ANESTHESIOLOGY

## 2017-05-08 PROCEDURE — 20610 DRAIN/INJ JOINT/BURSA W/O US: CPT | Mod: LT,,, | Performed by: ANESTHESIOLOGY

## 2017-05-08 PROCEDURE — 25000003 PHARM REV CODE 250: Performed by: ANESTHESIOLOGY

## 2017-05-08 PROCEDURE — 63600175 PHARM REV CODE 636 W HCPCS: Performed by: ANESTHESIOLOGY

## 2017-05-08 PROCEDURE — 25500020 PHARM REV CODE 255: Performed by: ANESTHESIOLOGY

## 2017-05-08 PROCEDURE — 20610 DRAIN/INJ JOINT/BURSA W/O US: CPT | Performed by: ANESTHESIOLOGY

## 2017-05-08 PROCEDURE — 77002 NEEDLE LOCALIZATION BY XRAY: CPT | Performed by: ANESTHESIOLOGY

## 2017-05-08 RX ORDER — TRIAMCINOLONE ACETONIDE 40 MG/ML
INJECTION, SUSPENSION INTRA-ARTICULAR; INTRAMUSCULAR
Status: DISCONTINUED | OUTPATIENT
Start: 2017-05-08 | End: 2017-05-08 | Stop reason: HOSPADM

## 2017-05-08 RX ORDER — LIDOCAINE HYDROCHLORIDE 10 MG/ML
INJECTION INFILTRATION; PERINEURAL
Status: DISCONTINUED | OUTPATIENT
Start: 2017-05-08 | End: 2017-05-08 | Stop reason: HOSPADM

## 2017-05-08 RX ORDER — SODIUM CHLORIDE 9 MG/ML
500 INJECTION, SOLUTION INTRAVENOUS CONTINUOUS
Status: DISCONTINUED | OUTPATIENT
Start: 2017-05-08 | End: 2017-05-08 | Stop reason: HOSPADM

## 2017-05-08 RX ORDER — ALPRAZOLAM 0.5 MG/1
1 TABLET, ORALLY DISINTEGRATING ORAL ONCE
Status: DISCONTINUED | OUTPATIENT
Start: 2017-05-08 | End: 2017-05-08 | Stop reason: HOSPADM

## 2017-05-08 RX ADMIN — IOHEXOL 50 ML: 300 INJECTION, SOLUTION INTRAVENOUS at 02:05

## 2017-05-08 RX ADMIN — TRIAMCINOLONE ACETONIDE 40 MG: 40 INJECTION, SUSPENSION INTRA-ARTICULAR; INTRAMUSCULAR at 02:05

## 2017-05-08 RX ADMIN — LIDOCAINE HYDROCHLORIDE 10 ML: 10 INJECTION, SOLUTION INFILTRATION; PERINEURAL at 02:05

## 2017-05-08 NOTE — OP NOTE
27- Hip Injection/Arthrogram  ANTERIOR APPROACH  Time-out taken to identify patient and procedure side prior to starting      the procedure.                                                                                                                         Date of Service: 05/08/2017    PCP: Primary Doctor No                                                                                             PROCEDURE:  Left hip joint injection under fluoroscopy.    REASON FOR PROCEDURE: left hip pain       PHYSICIAN: Rudy Wolf MD  ASSISTANTS: El Mcgee MD                                                                                              ANESTHESIA:  Xylocaine 1% 9ml with Sodium Bicarbonate 1ml. 3ml per site.                                                                                                              MEDICATIONS INJECTED:  Kenalog 20mg, bupivacaine 0.25% 2 mL (per side)                                                                                                                                    ESTIMATED BLOOD LOSS:  None.                                                                                                                              COMPLICATIONS:  None.     SEDATION: None                                                                                                                                    TECHNIQUE:  With the patient lying in the supine position the the femoral neck identified under fluoroscopy.  The area was prepped and draped in the usual       sterile fashion.  Local anesthetic was used, given by raising a wheal and    going down to the hub of the 27-gauge needle.  A 3-1/2 inch 22-gauge         needle was introduced under fluoroscopy until the tip reached the lateral aspect of the femoral neck.  When the tip of the needle was thought   to be in appropriate position, contrast was injected to confirm proper placement.  Medication was then injected slowly.   The patient tolerated the procedure well.                                                                                                                     PAIN BEFORE THE PROCEDURE: 5/10.                                                                                                                         PAIN AFTER THE PROCEDURE:  3/10.                                                                                                                          The patient was monitored for 20-30 minutes after the procedure and was      given post procedure and discharge instructions to follow at home.  The      patient is to follow-up with me in two weeks by calling.   The patient was discharged in a stable condtion.

## 2017-05-08 NOTE — DISCHARGE INSTRUCTIONS

## 2017-05-08 NOTE — PLAN OF CARE
Pt tolerated procedure well. Pt reports 3/10 pain after procedure. Assisted pt up for first time. Steady on feet. All discharge instructions given. Dressing clean, dry, intact.

## 2017-05-08 NOTE — IP AVS SNAPSHOT
[facility] [place], operated by [facility]  [address] [place], [place] [id number]  Phone: [phone]           Patient Discharge Instructions   Our goal is to set you up for success. This packet includes information on your condition, medications, and your home care.  It will help you care for yourself to prevent having to return to the hospital.     Please ask your nurse if you have any questions.      There are many details to remember when preparing to leave the hospital. Here is what you will need to do:    1. Take your medicine. If you are prescribed medications, review your Medication List on the following pages. You may have new medications to  at the pharmacy and others that you'll need to stop taking. Review the instructions for how and when to take your medications. Talk with your doctor or nurses if you are unsure of what to do.     2. Go to your follow-up appointments. Specific follow-up information is listed in the following pages. Your may be contacted by a nurse or clinical provider about future appointments. Be sure we have all of the phone numbers to reach you. Please contact your provider's office if you are unable to make an appointment.     3. Watch for warning signs. Your doctor or nurse will give you detailed warning signs to watch for and when to call for assistance. These instructions may also include educational information about your condition. If you experience any of warning signs to your health, call your doctor.           [facility]  Unless otherwise directed by your provider, please   contact [facility], our nurse care line   that is available for 24/7 assistance.     [phone] (toll-free)     Registered nurses in the [facility]   provide: appointment scheduling, clinical advisement, health education, and other advisory services.                  ** Verify the list of medication(s) below is accurate and up to date. Carry this with you in case of  emergency. If your medications have changed, please notify your healthcare provider.             Medication List      ASK your doctor about these medications        Additional Info                      hydrocodone-acetaminophen 5-325mg 5-325 mg per tablet   Commonly known as:  NORCO   Refills:  0      Begin Date    AM    Noon    PM    Bedtime       tizanidine 4 MG tablet   Commonly known as:  ZANAFLEX   Refills:  0      Begin Date    AM    Noon    PM    Bedtime                  Please bring to all follow up appointments:    1. A copy of your discharge instructions.  2. All medicines you are currently taking in their original bottles.  3. Identification and insurance card.    Please arrive 15 minutes ahead of scheduled appointment time.    Please call 24 hours in advance if you must reschedule your appointment and/or time.            Discharge Instructions       Home Care Instructions Pain Management:    1. DIET:   You may resume your normal diet today.   2. BATHING:   You may shower with luke warm water. No soaking in tub.  3. DRESSING:   You may remove your bandage today.   4. ACTIVITY LEVEL:   You may resume your normal activities 24 hrs after your procedure.  5. MEDICATIONS:   You may resume your normal medications today.   6. SPECIAL INSTRUCTIONS:   No heat to the injection site for 24 hrs including, bath or shower, heating pad, moist heat, or hot tubs.    Use ice pack to injection site for any pain or discomfort.  Apply ice packs for 20 minute intervals as needed.   If you have received any sedatives by mouth today you may not drive for 12 hours.    If you have received any sedation through your IV, you may not drive for 24 hrs.     PLEASE CALL YOUR DOCTOR IF:  1. Redness or swelling around the injection site.  2. Fever of 101 degrees  3. Drainage (pus) from the injection site.  4. For any continuous bleeding (some dried blood over the incision is normal.)  5. For severe headache that is relieved when lying  "flat.    FOR EMERGENCIES:   If any unusual problems or difficulties occur during clinic hours, call [phone] or [phone].         Admission Information     Date & Time Provider Department CSN    5/8/2017  1:13 PM Rudy Wolf MD [facility] [id number]      Care Providers     Provider Role Specialty Primary office phone    Rudy Wolf MD Attending Provider Pain Medicine [phone]    Rudy Wolf MD Surgeon  Pain Medicine [phone]      Your Vitals Were     BP Pulse Temp Resp Height Weight    153/80 (BP Location: Right arm, Patient Position: Lying, BP Method: Automatic) 68 98.4 °F (36.9 °C) (Oral) 17 6' 1" (1.854 m) 86.2 kg (190 lb)    SpO2 BMI             100% 25.07 kg/m2         Recent Lab Values     No lab values to display.      Allergies as of 5/8/2017        Reactions    Valium [Diazepam]       Advance Directives     An advance directive is a document which, in the event you are no longer able to make decisions for yourself, tells your healthcare team what kind of treatment you do or do not want to receive, or who you would like to make those decisions for you.  If you do not currently have an advance directive, [facility] encourages you to create one.  For more information call:  [phone]), [phone] ([phone]),  or log on to [email or web address].        Language Assistance Services     ATTENTION: Language assistance services are available, free of charge. Please call [phone].      ATENCIÓN: Si habla español, tiene a portillo disposición servicios gratuitos de asistencia lingüística. Llame al [phone].     CHÚ Ý: N?u b?n nói Ti?ng Vi?t, có các d?ch v? h? tr? ngôn ng? mi?n phí dành cho b?n. G?i s? [phone].        [facility] Sign-Up     Activating your [facility] account is as easy as 1-2-3!     1) Visit [email or web address], select Sign Up Now, enter this activation code and your date of birth, then select Next.  [email or web address]  Expires: 6/22/2017  3:10 " PM      2) Create a username and password to use when you visit [facility] in the future and select a security question in case you lose your password and select Next.    3) Enter your e-mail address and click Sign Up!    Additional Information  If you have questions, please e-mail [email or web address] or call [phone] to talk to our [facility] staff. Remember, [facility] is NOT to be used for urgent needs. For medical emergencies, dial 911.          [facility] complies with applicable Federal civil rights laws and does not discriminate on the basis of race, color, national origin, age, disability, or sex.

## 2017-05-24 ENCOUNTER — OFFICE VISIT (OUTPATIENT)
Dept: SPORTS MEDICINE | Facility: CLINIC | Age: 27
End: 2017-05-24
Payer: COMMERCIAL

## 2017-05-24 VITALS
SYSTOLIC BLOOD PRESSURE: 140 MMHG | HEART RATE: 81 BPM | BODY MASS INDEX: 25.18 KG/M2 | HEIGHT: 73 IN | WEIGHT: 190 LBS | DIASTOLIC BLOOD PRESSURE: 90 MMHG

## 2017-05-24 DIAGNOSIS — M25.852 FEMOROACETABULAR IMPINGEMENT OF LEFT HIP: ICD-10-CM

## 2017-05-24 DIAGNOSIS — M16.12 OSTEOARTHRITIS OF LEFT HIP, UNSPECIFIED OSTEOARTHRITIS TYPE: ICD-10-CM

## 2017-05-24 DIAGNOSIS — S73.192D TEAR OF LEFT ACETABULAR LABRUM, SUBSEQUENT ENCOUNTER: Primary | ICD-10-CM

## 2017-05-24 PROCEDURE — 99214 OFFICE O/P EST MOD 30 MIN: CPT | Mod: S$GLB,,, | Performed by: ORTHOPAEDIC SURGERY

## 2017-05-24 PROCEDURE — 99999 PR PBB SHADOW E&M-EST. PATIENT-LVL III: CPT | Mod: PBBFAC,,, | Performed by: ORTHOPAEDIC SURGERY

## 2017-05-24 NOTE — PROGRESS NOTES
CC:left hip pain. Referred by Dr Collins    HPI:   Joao Jang is a pleasant 26 y.o. patient who reports to clinic with left hip pain . 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 Today the patient rates pain at a 2/10 on visual analog scale.        [facility] months duration,  [facility]  Getting in and out of car + pain    Affecting ADLs and exercising    any activity worst-walking    Had hip arthroscopy Nov 21, 2016. Dr. Delmer Osborn.    He had an appointment to discuss hip resurfacing with Dr. Collins and the patient has decided to try injections and possibly arthroscopy before proceeding with resurfacing    Patient had injection with Dr. Wolf 2 weeks ago   Patient notes 80% relief following injection   He notes he still has some flare ups but his pain has improved    He also notes his primary care physician prescribing him Norco as needed for flare ups     Review of Systems   Constitution: Negative. Negative for chills, fever and night sweats.   HENT: Negative for congestion and headaches.    Eyes: Negative for blurred vision, left vision loss and right vision loss.   Cardiovascular: Negative for chest pain and syncope.   Respiratory: Negative for cough and shortness of breath.    Endocrine: Negative for polydipsia, polyphagia and polyuria.   Hematologic/Lymphatic: Negative for bleeding problem. Does not bruise/bleed easily.   Skin: Negative for dry skin, itching and rash.   Musculoskeletal: Negative for falls and muscle weakness.   Gastrointestinal: Negative for abdominal pain and bowel incontinence.   Genitourinary: Negative for bladder incontinence and nocturia.   Neurological: Negative for disturbances in coordination, loss of balance and seizures.   Psychiatric/Behavioral: Negative for depression. The patient does not have insomnia.    Allergic/Immunologic: Negative for hives and persistent infections.     PAST MEDICAL HISTORY: History reviewed. No  "pertinent past medical history.  PAST SURGICAL HISTORY:   Past Surgical History:   Procedure Laterality Date    HIP SURGERY      KNEE ARTHROSCOPY       FAMILY HISTORY:   Family History   Problem Relation Age of Onset    No Known Problems Mother     Hypertension Father      SOCIAL HISTORY:   Social History     Social History    Marital status: Single     Spouse name: N/A    Number of children: N/A    Years of education: N/A     Occupational History    Not on file.     Social History Main Topics    Smoking status: Never Smoker    Smokeless tobacco: Not on file    Alcohol use Not on file    Drug use: Unknown    Sexual activity: Not on file     Other Topics Concern    Not on file     Social History Narrative    No narrative on file       MEDICATIONS:   Current Outpatient Prescriptions:     hydrocodone-acetaminophen 5-325mg (NORCO) 5-325 mg per tablet, , Disp: , Rfl:     tizanidine (ZANAFLEX) 4 MG tablet, , Disp: , Rfl:   ALLERGIES:   Review of patient's allergies indicates:   Allergen Reactions    Valium [diazepam]        VITAL SIGNS: BP (!) 140/90   Pulse 81   Ht 6' 1" (1.854 m)   Wt 86.2 kg (190 lb)   BMI 25.07 kg/m²        PHYSICAL EXAM / HIP  PHYSICAL EXAMINATION  General:  The patient is alert and oriented x 3.  Mood is pleasant.  Observation of ears, eyes and nose reveal no gross abnormalities.  HEENT: NCAT, sclera nonicteric  Lungs: Respirations are equal and unlabored.    left HIP EXAMINATION     OBSERVATION / INSPECTION  Gait:   Nonantalgic   Alignment:  Neutral   Scars:   None   Muscle atrophy: None   Effusion:  None   Warmth:  None   Discoloration:   None   Leg lengths:   Equal   Pelvis:    Level     TENDERNESS / CREPITUS (T/C):      T / C  Trochanteric bursa   - / -  Piriformis    - / -  SI joint    - / -  Psoas tendon   - / -  Rectus insertion  - / -  Adductor insertion  - / -  Pubic symphysis  - / -    ROM: (* = pain)    Flexion:    120 degrees*  External rotation: 40 " degrees  Internal rotation with axial load: 20 degrees*  Internal rotation without axial load: 30 degrees*  Abduction:  45 degrees  Adduction:   20 degrees    SPECIAL TESTS:  Pain w/ forced internal rotation (FADIR): +   Pain w/ forced external rotation (VIMAL): Absent   VIMAL asymmetry:     +  Circumduction test:    +  Stinchfield test:    Negative   Log roll:      Negative   Snapping hip (internal):   Negative   Sit-up pain:     Negative   Resisted sit-up pain:    Negative   Resisted sit-up w/ adductor contraction pain:Negative   Step-down test:    +  Trendelenburg test:    Negative   Bridge test     -    EXTREMITY NEURO-VASCULAR EXAMINATION:   Sensation:  Grossly intact to light touch all dermatomal regions.   Motor Function:  Fully intact motor function at hip, knee, foot and ankle    DTRs;  quadriceps and  achilles 2+.  No clonus and downgoing Babinski.    Vascular status:  DP and PT pulses 2+, brisk capillary refill, symmetric.    Skin: intact, compartments soft.    OTHER FINDINGS:    XRAYS:  CE angle: 40   Crossover: +   CAM:  +  Joint space narrowing: +cystic changes femoral head and acetabulum  Tonnis 2     Assessment:  Left hip, labral tear, osteoarthritis, EMA, loose bodies     Plan:  We will see how long he has relief from this injection, If the relief lasts then we will proceed with continued injections for conservative treatments     If his relief does not last then we will proceed with the following:    We informed the patient that we could proceed with arthroscopy but could guarantee no pain relief due to the arthritis in his joint. I also discussed hip resurfacing with the patient and he notes that he would like to think about his options, do some research, and would call us back tomorrow. I informed him that in the interim I will inquire who Dr. Perez would recommend should he want to see a physician to discuss the hip resurfacing procedure.      ASSESSMENT:    Left Hip labral tear.  I have  reviewed the MRI .he  has tearing in the labrum.      he would benefit from hip arthroscopy, given the above.      PLAN: We have discussed the surgery and recovery of arthroscopic hip surgery. he understands that there may be limited weightbearing up to several weeks (usually 3 weeks) after surgery depending on procedures that are performed at the time of surgery.     The spectrum of treatment options were discussed with the patient, including nonoperative and operative options.  After thorough discussion, the patient has elected to undergo surgical treatment to include:     left                        a. Hip arthroscopic labral repair versus debridement                        b. Hip arthroscopic loose body removal                        c. Hip arthroscopic femoral neck osteoplasty                        d. Hip arthroscopic acetabuloplasty                        e. Hip arthroscopic partial synovectomy/debridement                        f. Hip arthroscopic capsular closure                        g.  Hip arthroscopic-assisted Bio-D arthrocentesis        The details of the surgical procedure were explained, including the location of probable incisions and a description of likely hardware and/or grafts to be used.  The patient understands the likely convalescence after surgery.  Also, we have thoroughly discussed the risks, benefits and alternatives to surgery, including, but not limited to, the risk of infection, joint stiffness, skin injury to perineum, heterotopic ossification, arthritis, blood clot (including DVT and/or pulmonary embolus), neurologic and vascular injury.  It was explained that, if tissue has been repaired or reconstructed, there is a chance of failure, which may require further management.       Heterotopic ossification is a known complication of hip arthroscopy and Naprosyn significantly decreases this risk.  According to Wisam in the Journal of Orthopedic Traumatology 2010, the rate of  heterotopic ossification for cases with Naprosyn for prophylaxis was 0% and without Naprosyn for prophylaxis was 33%.  Bart at the International Society of Hip Arthroscopy Annual Meeting in 2012 showed similar results with an incidence of heterotopic ossification in patients that did receive Naprosyn prophylaxis was 4.5% versus versus 23.6% in patients who did not receive Naproxen twice a day for three weeks.  Brittany's study in the American Journal of Sports Medicine in 2012 showed the addition of Indocin to the medication protocol (in addition to Naprosyn) decreased the rate of heterotopic ossification from 8.3% to 1.8%.      Patient has chondral damage to hip.  Therefore, I can offer no guarantee whatsoever to the results of a hip arthroscopic surgery and hip arthroscopic surgery would be less predictable and possibility of worsening of condition and need for subsequent hip arthroplasty or further surgery may be needed.  I believe that arthroscopic surgery will benefit the patient.  I explained this in detail today and patient acknowledged understanding and wishes to proceed.

## 2017-05-24 NOTE — LETTER
Patient: Joao Jang   YOB: 1990   Clinic Number: [id number]   Today's Date: May 24, 2017        Certificate to Return to Work     Joao was seen by Bebe Perez MD on 5/24/2017.    Please excuse Joao from work missed on 5/24/2017. His appointment was scheduled for 1:30 but we were running behind and the patient was not seen until 3:30.     If you have any questions or concerns, please feel free to contact the office at [phone].    Thank you.    Bebe Peerz MD        Signature: __________________________________________________    Janie Ochoa   Clinical assistant to Dr. Bebe Perez

## 2018-01-26 ENCOUNTER — TELEPHONE (OUTPATIENT)
Dept: PAIN MEDICINE | Facility: CLINIC | Age: 28
End: 2018-01-26

## 2018-01-26 NOTE — TELEPHONE ENCOUNTER
Contacted and spoke to patient, he was informed that he was a direct referral for the procedure in May. He was informed that he would either have to contact Dr. Perez office to place another order or schedule an appointment to be evaluated by Dr. Wolf to determine what is required.     Patient opted to contact Dr. Perez office to place another procedure order.

## 2018-01-26 NOTE — TELEPHONE ENCOUNTER
----- Message from Arcelia Egan MA sent at 1/25/2018  3:57 PM CST -----  Yuliana Grant Staff  Caller: pt (Today,  9:34 AM)         x_  1st Request   _  2nd Request   _  3rd Request     Who: SHASHI BANKS [[id number]]     Why: Patient would like to speak with staff to schedule an injection.     What Number to Call Back:[phone]     When to Expect a call back: (Within 24 hours)     Please return the call at earliest convenience. Thanks!

## 2018-12-31 ENCOUNTER — APPOINTMENT (OUTPATIENT)
Dept: URGENT CARE | Facility: CLINIC | Age: 28
End: 2018-12-31

## 2018-12-31 DIAGNOSIS — Z00.00 PE (PHYSICAL EXAM), ROUTINE: Primary | ICD-10-CM

## 2018-12-31 PROCEDURE — 99172 OCULAR FUNCTION SCREEN: CPT | Mod: S$GLB,,, | Performed by: PHYSICIAN ASSISTANT

## 2018-12-31 PROCEDURE — 71045 X-RAY EXAM CHEST 1 VIEW: CPT | Mod: FY,S$GLB,, | Performed by: RADIOLOGY

## 2018-12-31 PROCEDURE — 80305 DRUG TEST PRSMV DIR OPT OBS: CPT | Mod: S$GLB,,, | Performed by: PHYSICIAN ASSISTANT

## 2018-12-31 PROCEDURE — 94010 BREATHING CAPACITY TEST: CPT | Mod: S$GLB,,, | Performed by: PHYSICIAN ASSISTANT

## 2018-12-31 PROCEDURE — 99499 UNLISTED E&M SERVICE: CPT | Mod: S$GLB,,, | Performed by: PHYSICIAN ASSISTANT

## 2018-12-31 PROCEDURE — 93000 ELECTROCARDIOGRAM COMPLETE: CPT | Mod: S$GLB,,, | Performed by: PHYSICIAN ASSISTANT

## 2018-12-31 PROCEDURE — 89240 UNLISTED MISC PATH TEST: CPT | Mod: S$GLB,,, | Performed by: PHYSICIAN ASSISTANT

## 2018-12-31 PROCEDURE — 99080 SPECIAL REPORTS OR FORMS: CPT | Mod: S$GLB,,, | Performed by: PHYSICIAN ASSISTANT

## 2018-12-31 PROCEDURE — 92552 PURE TONE AUDIOMETRY AIR: CPT | Mod: S$GLB,,, | Performed by: PHYSICIAN ASSISTANT

## 2019-01-01 LAB
ALBUMIN SERPL-MCNC: 5.3 G/DL (ref 3.5–5.5)
ALBUMIN/GLOB SERPL: 2.1 {RATIO} (ref 1.2–2.2)
ALP SERPL-CCNC: 100 IU/L (ref 39–117)
ALT SERPL-CCNC: 18 IU/L (ref 0–44)
APPEARANCE UR: CLEAR
AST SERPL-CCNC: 19 IU/L (ref 0–40)
BASOPHILS # BLD AUTO: 0 X10E3/UL (ref 0–0.2)
BASOPHILS NFR BLD AUTO: 0 %
BILIRUB SERPL-MCNC: 0.5 MG/DL (ref 0–1.2)
BILIRUB UR QL STRIP: NEGATIVE
BUN SERPL-MCNC: 13 MG/DL (ref 6–20)
BUN/CREAT SERPL: 12 (ref 9–20)
CALCIUM SERPL-MCNC: 10.5 MG/DL (ref 8.7–10.2)
CHLORIDE SERPL-SCNC: 101 MMOL/L (ref 96–106)
CHOLEST SERPL-MCNC: 179 MG/DL (ref 100–199)
CO2 SERPL-SCNC: 24 MMOL/L (ref 20–29)
COLOR UR: YELLOW
CREAT SERPL-MCNC: 1.08 MG/DL (ref 0.76–1.27)
EGFR IF AFRICAN AMERICAN: 106 ML/MIN/1.73
EOSINOPHIL # BLD AUTO: 0.1 X10E3/UL (ref 0–0.4)
EOSINOPHIL NFR BLD AUTO: 1 %
ERYTHROCYTE [DISTWIDTH] IN BLOOD BY AUTOMATED COUNT: 12.8 % (ref 12.3–15.4)
EST. GFR  (NON AFRICAN AMERICAN): 92 ML/MIN/1.73
GGT SERPL-CCNC: 23 IU/L (ref 0–65)
GLOBULIN SER CALC-MCNC: 2.5 G/DL (ref 1.5–4.5)
GLUCOSE SERPL-MCNC: 88 MG/DL (ref 65–99)
GLUCOSE UR QL: NEGATIVE
HCT VFR BLD AUTO: 44.7 % (ref 37.5–51)
HDLC SERPL-MCNC: 100 MG/DL
HGB BLD-MCNC: 14.4 G/DL (ref 13–17.7)
HGB UR QL STRIP: NEGATIVE
IMM GRANULOCYTES # BLD: 0 X10E3/UL (ref 0–0.1)
IMM GRANULOCYTES NFR BLD: 0 %
KETONES UR QL STRIP: NEGATIVE
LDLC SERPL CALC-MCNC: 63 MG/DL (ref 0–99)
LEUKOCYTE ESTERASE UR QL STRIP: NEGATIVE
LYMPHOCYTES # BLD AUTO: 1.7 X10E3/UL (ref 0.7–3.1)
LYMPHOCYTES NFR BLD AUTO: 34 %
MCH RBC QN AUTO: 27.7 PG (ref 26.6–33)
MCHC RBC AUTO-ENTMCNC: 32.2 G/DL (ref 31.5–35.7)
MCV RBC AUTO: 86 FL (ref 79–97)
MICRO URNS: ABNORMAL
MONOCYTES # BLD AUTO: 0.4 X10E3/UL (ref 0.1–0.9)
MONOCYTES NFR BLD AUTO: 8 %
NEUTROPHILS # BLD AUTO: 2.8 X10E3/UL (ref 1.4–7)
NEUTROPHILS NFR BLD AUTO: 57 %
NITRITE UR QL STRIP: NEGATIVE
PH UR STRIP: 8.5 [PH] (ref 5–7.5)
PLATELET # BLD AUTO: 330 X10E3/UL (ref 150–379)
POTASSIUM SERPL-SCNC: 5 MMOL/L (ref 3.5–5.2)
PROT SERPL-MCNC: 7.8 G/DL (ref 6–8.5)
PROT UR QL STRIP: NEGATIVE
RBC # BLD AUTO: 5.2 X10E6/UL (ref 4.14–5.8)
SODIUM SERPL-SCNC: 142 MMOL/L (ref 134–144)
SP GR UR: 1.02 (ref 1–1.03)
TRIGL SERPL-MCNC: 80 MG/DL (ref 0–149)
UROBILINOGEN UR STRIP-MCNC: 0.2 MG/DL (ref 0.2–1)
VLDLC SERPL CALC-MCNC: 16 MG/DL (ref 5–40)
WBC # BLD AUTO: 5 X10E3/UL (ref 3.4–10.8)

## 2020-02-10 ENCOUNTER — OFFICE VISIT (OUTPATIENT)
Dept: SPORTS MEDICINE | Facility: CLINIC | Age: 30
End: 2020-02-10
Payer: COMMERCIAL

## 2020-02-10 ENCOUNTER — HOSPITAL ENCOUNTER (OUTPATIENT)
Dept: RADIOLOGY | Facility: HOSPITAL | Age: 30
Discharge: HOME | End: 2020-02-10
Attending: ORTHOPAEDIC SURGERY
Payer: COMMERCIAL

## 2020-02-10 VITALS
DIASTOLIC BLOOD PRESSURE: 85 MMHG | HEART RATE: 81 BPM | WEIGHT: 190 LBS | SYSTOLIC BLOOD PRESSURE: 141 MMHG | HEIGHT: 73 IN | BODY MASS INDEX: 25.18 KG/M2

## 2020-02-10 DIAGNOSIS — M25.552 LEFT HIP PAIN: ICD-10-CM

## 2020-02-10 DIAGNOSIS — M25.552 LEFT HIP PAIN: Primary | ICD-10-CM

## 2020-02-10 PROCEDURE — 99999 PR PBB SHADOW E&M-EST. PATIENT-LVL III: CPT | Mod: PBBFAC,,, | Performed by: ORTHOPAEDIC SURGERY

## 2020-02-10 PROCEDURE — 99214 OFFICE O/P EST MOD 30 MIN: CPT | Mod: S$GLB,,, | Performed by: ORTHOPAEDIC SURGERY

## 2020-02-10 PROCEDURE — 73503 XR HIP 4 OR MORE VIEWS LEFT: ICD-10-PCS | Mod: 26,LT,, | Performed by: RADIOLOGY

## 2020-02-10 PROCEDURE — 3008F PR BODY MASS INDEX (BMI) DOCUMENTED: ICD-10-PCS | Mod: CPTII,S$GLB,, | Performed by: ORTHOPAEDIC SURGERY

## 2020-02-10 PROCEDURE — 99214 PR OFFICE/OUTPT VISIT, EST, LEVL IV, 30-39 MIN: ICD-10-PCS | Mod: S$GLB,,, | Performed by: ORTHOPAEDIC SURGERY

## 2020-02-10 PROCEDURE — 73503 X-RAY EXAM HIP UNI 4/> VIEWS: CPT | Mod: 26,LT,, | Performed by: RADIOLOGY

## 2020-02-10 PROCEDURE — 3008F BODY MASS INDEX DOCD: CPT | Mod: CPTII,S$GLB,, | Performed by: ORTHOPAEDIC SURGERY

## 2020-02-10 PROCEDURE — 99999 PR PBB SHADOW E&M-EST. PATIENT-LVL III: ICD-10-PCS | Mod: PBBFAC,,, | Performed by: ORTHOPAEDIC SURGERY

## 2020-02-10 PROCEDURE — 73503 X-RAY EXAM HIP UNI 4/> VIEWS: CPT | Mod: TC,LT

## 2020-02-10 RX ORDER — PANTOPRAZOLE SODIUM 20 MG/1
20 TABLET, DELAYED RELEASE ORAL DAILY
COMMUNITY

## 2020-02-10 RX ORDER — LISDEXAMFETAMINE DIMESYLATE 50 MG/1
50 CAPSULE ORAL EVERY MORNING
COMMUNITY

## 2020-02-10 NOTE — PROGRESS NOTES
CC:left hip pain. Referred by Dr Collins     HPI:   Joao Jang is a pleasant 26 y.o. patient who reports to clinic with left hip pain . 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 Today the patient rates pain at a 4/10 on visual analog scale.         [facility] months duration[facility]  [facility].  Getting in and out of car + pain     Affecting ADLs and exercising     any activity worst-walking     Had hip arthroscopy Nov 21, 2016. Dr. Delmer Osborn.     He had an appointment to discuss hip resurfacing with Dr. Collins and the patient has decided to try injections and possibly arthroscopy before proceeding with resurfacing     Patient had injection with Dr. Wolf 2 weeks ago   Patient notes 80% relief following injection   He notes he still has some flare ups but his pain has improved     He also notes his primary care physician prescribing him Norco as needed for flare ups      Review of Systems   Constitution: Negative. Negative for chills, fever and night sweats.   HENT: Negative for congestion and headaches.    Eyes: Negative for blurred vision, left vision loss and right vision loss.   Cardiovascular: Negative for chest pain and syncope.   Respiratory: Negative for cough and shortness of breath.    Endocrine: Negative for polydipsia, polyphagia and polyuria.   Hematologic/Lymphatic: Negative for bleeding problem. Does not bruise/bleed easily.   Skin: Negative for dry skin, itching and rash.   Musculoskeletal: Negative for falls and muscle weakness.   Gastrointestinal: Negative for abdominal pain and bowel incontinence.   Genitourinary: Negative for bladder incontinence and nocturia.   Neurological: Negative for disturbances in coordination, loss of balance and seizures.   Psychiatric/Behavioral: Negative for depression. The patient does not have insomnia.    Allergic/Immunologic: Negative for hives and persistent infections.      PAST MEDICAL HISTORY: History  "reviewed. No pertinent past medical history.  PAST SURGICAL HISTORY:         Past Surgical History:   Procedure Laterality Date    HIP SURGERY        KNEE ARTHROSCOPY          FAMILY HISTORY:         Family History   Problem Relation Age of Onset    No Known Problems Mother      Hypertension Father        SOCIAL HISTORY:   Social History      Social History    Marital status: Single       Spouse name: N/A    Number of children: N/A    Years of education: N/A          Occupational History    Not on file.           Social History Main Topics    Smoking status: Never Smoker    Smokeless tobacco: Not on file    Alcohol use Not on file    Drug use: Unknown    Sexual activity: Not on file           Other Topics Concern    Not on file          Social History Narrative    No narrative on file         MEDICATIONS:   Current Outpatient Prescriptions:     hydrocodone-acetaminophen 5-325mg (NORCO) 5-325 mg per tablet, , Disp: , Rfl:     tizanidine (ZANAFLEX) 4 MG tablet, , Disp: , Rfl:   ALLERGIES:        Review of patient's allergies indicates:   Allergen Reactions    Valium [diazepam]           VITAL SIGNS: BP (!) 140/90   Pulse 81   Ht 6' 1" (1.854 m)   Wt 86.2 kg (190 lb)   BMI 25.07 kg/m²          PHYSICAL EXAM / HIP  PHYSICAL EXAMINATION  General:  The patient is alert and oriented x 3.  Mood is pleasant.  Observation of ears, eyes and nose reveal no gross abnormalities.  HEENT: NCAT, sclera nonicteric  Lungs: Respirations are equal and unlabored.     left HIP EXAMINATION      OBSERVATION / INSPECTION  Gait:                             Nonantalgic   Alignment:                   Neutral   Scars:                          None   Muscle atrophy:          None   Effusion:                      None   Warmth:                      None   Discoloration:              None   Leg lengths:                Equal   Pelvis:                          Level      TENDERNESS / CREPITUS (T/C):                               "                    T / C  Trochanteric bursa                  - / -  Piriformis                                 - / -  SI joint                                     - / -  Psoas tendon                          - / -  Rectus insertion                      - / -  Adductor insertion                   - / -  Pubic symphysis                     - / -     ROM:   (* = pain)    Flexion:                       120 degrees*  External rotation:         40 degrees  Internal rotation with axial load: 20 degrees*  Internal rotation without axial load: 30 degrees*  Abduction:                   45 degrees  Adduction:                   20 degrees     SPECIAL TESTS:  Pain w/ forced internal rotation (FADIR):       +   Pain w/ forced external rotation (VIMAL):     Absent   VIMAL asymmetry:                                        +  Circumduction test:                                         +  Stinchfield test:                                               Negative   Log roll:                                                           Negative   Snapping hip (internal):                                  Negative   Sit-up pain:                                                      Negative   Resisted sit-up pain:                                       Negative   Resisted sit-up w/ adductor contraction pain:Negative   Step-down test:                                               +  Trendelenburg test:                                         Negative   Bridge test                                                       -     EXTREMITY NEURO-VASCULAR EXAMINATION:     Sensation:  Grossly intact to light touch all dermatomal regions.   Motor Function:  Fully intact motor function at hip, knee, foot and ankle    DTRs;  quadriceps and  achilles 2+.  No clonus and downgoing Babinski.    Vascular status:  DP and PT pulses 2+, brisk capillary refill, symmetric.    Skin: intact, compartments soft.     OTHER FINDINGS:     XRAYS:  CE angle: 40      Crossover: +      CAM:   +  Joint space narrowing: +cystic changes femoral head and acetabulum, + spurs  Tonnis 2               Assessment:  Left hip, labral tear, osteoarthritis, EMA, loose bodies      Plan:  We will see how long he has relief from this injection, If the relief lasts then we will proceed with continued injections for conservative treatments      If his relief does not last then we will proceed with the following:     We informed the patient that we could proceed with arthroscopy but could guarantee no pain relief due to the arthritis in his joint. I also discussed hip resurfacing with the patient and he notes that he would like to think about his options, do some research, and would call us back tomorrow. I informed him that in the interim I will inquire who Dr. Perez would recommend should he want to see a physician to discuss the hip resurfacing procedure.      ASSESSMENT:    Left Hip labral tear.  I have reviewed the MRI .he  has tearing in the labrum.      he would benefit from hip arthroscopy, given the above.      PLAN: We have discussed the surgery and recovery of arthroscopic hip surgery. he understands that there may be limited weightbearing up to several weeks (usually 3 weeks) after surgery depending on procedures that are performed at the time of surgery.     The spectrum of treatment options were discussed with the patient, including nonoperative and operative options.  After thorough discussion, the patient has elected to undergo surgical treatment to include:     left                        a. Hip arthroscopic labral repair versus debridement                        b. Hip arthroscopic loose body removal                        c. Hip arthroscopic femoral neck osteoplasty                        d. Hip arthroscopic acetabuloplasty                        e. Hip arthroscopic partial synovectomy/debridement                        f. Hip arthroscopic capsular  patricia                        g.  Hip arthroscopic-assisted Bio-D arthrocentesis        The details of the surgical procedure were explained, including the location of probable incisions and a description of likely hardware and/or grafts to be used.  The patient understands the likely convalescence after surgery.  Also, we have thoroughly discussed the risks, benefits and alternatives to surgery, including, but not limited to, the risk of infection, joint stiffness, skin injury to perineum, heterotopic ossification, arthritis, blood clot (including DVT and/or pulmonary embolus), neurologic and vascular injury.  It was explained that, if tissue has been repaired or reconstructed, there is a chance of failure, which may require further management.       Heterotopic ossification is a known complication of hip arthroscopy and Naprosyn significantly decreases this risk.  According to Wisam in the Journal of Orthopedic Traumatology 2010, the rate of heterotopic ossification for cases with Naprosyn for prophylaxis was 0% and without Naprosyn for prophylaxis was 33%.  Bart at the International Society of Hip Arthroscopy Annual Meeting in 2012 showed similar results with an incidence of heterotopic ossification in patients that did receive Naprosyn prophylaxis was 4.5% versus versus 23.6% in patients who did not receive Naproxen twice a day for three weeks.  Brittany's study in the American Journal of Sports Medicine in 2012 showed the addition of Indocin to the medication protocol (in addition to Naprosyn) decreased the rate of heterotopic ossification from 8.3% to 1.8%.      Patient has chondral damage to hip.  Therefore, I can offer no guarantee whatsoever to the results of a hip arthroscopic surgery and hip arthroscopic surgery would be less predictable and possibility of worsening of condition and need for subsequent hip arthroplasty or further surgery may be needed.  I believe that arthroscopic surgery will benefit  the patient.  I explained this in detail today and patient acknowledged understanding and wishes to proceed.

## 2020-02-19 ENCOUNTER — HOSPITAL ENCOUNTER (OUTPATIENT)
Dept: RADIOLOGY | Facility: HOSPITAL | Age: 30
Discharge: HOME | End: 2020-02-19
Attending: ORTHOPAEDIC SURGERY
Payer: COMMERCIAL

## 2020-02-19 DIAGNOSIS — M25.552 LEFT HIP PAIN: ICD-10-CM

## 2020-02-19 PROCEDURE — 25000003 PHARM REV CODE 250: Performed by: ORTHOPAEDIC SURGERY

## 2020-02-19 PROCEDURE — 73722 MRI JOINT OF LWR EXTR W/DYE: CPT | Mod: 26,LT,, | Performed by: RADIOLOGY

## 2020-02-19 PROCEDURE — 73525 CONTRAST X-RAY OF HIP: CPT | Mod: 26,LT,, | Performed by: RADIOLOGY

## 2020-02-19 PROCEDURE — 27093 INJECTION FOR HIP X-RAY: CPT | Mod: ,,, | Performed by: RADIOLOGY

## 2020-02-19 PROCEDURE — 73525 XR ARTHROGRAM HIP LEFT WITH MRI TO FOLLOW: ICD-10-PCS | Mod: 26,LT,, | Performed by: RADIOLOGY

## 2020-02-19 PROCEDURE — 25500020 PHARM REV CODE 255: Performed by: ORTHOPAEDIC SURGERY

## 2020-02-19 PROCEDURE — 73722 MRI ARTHROGRAM HIP LEFT: ICD-10-PCS | Mod: 26,LT,, | Performed by: RADIOLOGY

## 2020-02-19 PROCEDURE — 27093 XR ARTHROGRAM HIP LEFT WITH MRI TO FOLLOW: ICD-10-PCS | Mod: ,,, | Performed by: RADIOLOGY

## 2020-02-19 PROCEDURE — 27093 INJECTION FOR HIP X-RAY: CPT

## 2020-02-19 PROCEDURE — 73722 MRI JOINT OF LWR EXTR W/DYE: CPT | Mod: TC,LT

## 2020-02-19 PROCEDURE — A9585 GADOBUTROL INJECTION: HCPCS | Performed by: ORTHOPAEDIC SURGERY

## 2020-02-19 RX ORDER — LIDOCAINE HYDROCHLORIDE 10 MG/ML
5 INJECTION INFILTRATION; PERINEURAL ONCE
Status: COMPLETED | OUTPATIENT
Start: 2020-02-19 | End: 2020-02-19

## 2020-02-19 RX ORDER — GADOBUTROL 604.72 MG/ML
5 INJECTION INTRAVENOUS
Status: COMPLETED | OUTPATIENT
Start: 2020-02-19 | End: 2020-02-19

## 2020-02-19 RX ORDER — BUPIVACAINE HYDROCHLORIDE 2.5 MG/ML
5 INJECTION, SOLUTION EPIDURAL; INFILTRATION; INTRACAUDAL ONCE
Status: COMPLETED | OUTPATIENT
Start: 2020-02-19 | End: 2020-02-19

## 2020-02-19 RX ADMIN — GADOBUTROL 5 ML: 604.72 INJECTION INTRAVENOUS at 01:02

## 2020-02-19 RX ADMIN — BUPIVACAINE HYDROCHLORIDE 12.5 MG: 2.5 INJECTION, SOLUTION EPIDURAL; INFILTRATION; INTRACAUDAL; PERINEURAL at 01:02

## 2020-02-19 RX ADMIN — IOHEXOL 5 ML: 300 INJECTION, SOLUTION INTRAVENOUS at 01:02

## 2020-02-19 RX ADMIN — LIDOCAINE HYDROCHLORIDE 5 ML: 10 INJECTION, SOLUTION INFILTRATION; PERINEURAL at 01:02

## 2020-02-27 ENCOUNTER — TELEPHONE (OUTPATIENT)
Dept: SPORTS MEDICINE | Facility: CLINIC | Age: 30
End: 2020-02-27

## 2020-02-28 NOTE — TELEPHONE ENCOUNTER
Called patient to disuss MRI results and operative versus non operative management. Unable to reach patient after 3 attempts and left voicemail. Discussed with dr. hooks who recommended hip arthroscopic surgery (see prior progress note) with plan.

## 2020-03-04 ENCOUNTER — TELEPHONE (OUTPATIENT)
Dept: SPORTS MEDICINE | Facility: CLINIC | Age: 30
End: 2020-03-04

## 2020-03-04 NOTE — TELEPHONE ENCOUNTER
----- Message from Soila Benavides sent at 3/4/2020  4:27 PM CST -----  Contact: self  Pt need clarification on who is doing his surgery , he states Dr. Franco called him and left a message but he doesn't know that Dr. Franco  Asking for a call back.      Contact info [phone]

## 2020-03-09 ENCOUNTER — TELEPHONE (OUTPATIENT)
Dept: SPORTS MEDICINE | Facility: CLINIC | Age: 30
End: 2020-03-09

## 2020-03-09 NOTE — TELEPHONE ENCOUNTER
Called patient to disuss MRI results and operative versus non operative management. Patient looking to get surgery as soon as possible. Jemma will call with dates. Discussed with dr. hooks who recommended hip arthroscopic surgery (see prior progress note) with plan.

## 2020-03-10 ENCOUNTER — TELEPHONE (OUTPATIENT)
Dept: SPORTS MEDICINE | Facility: CLINIC | Age: 30
End: 2020-03-10

## 2020-03-10 DIAGNOSIS — S73.192A TEAR OF LEFT ACETABULAR LABRUM, INITIAL ENCOUNTER: Primary | ICD-10-CM

## 2020-03-10 DIAGNOSIS — M65.9 SYNOVITIS: ICD-10-CM

## 2020-03-10 DIAGNOSIS — M25.552 LEFT HIP PAIN: ICD-10-CM

## 2020-03-10 DIAGNOSIS — M25.9 DISORDER OF HIP JOINT: ICD-10-CM

## 2020-03-10 NOTE — TELEPHONE ENCOUNTER
I spoke with the patient and he is inquiring about how long after surgery will he be able to return to walking, climbing stairs and ladders. I informed him that it would likely be 3 months post op.       Plan:  We will see how long he has relief from this injection, If the relief lasts then we will proceed with continued injections for conservative treatments      If his relief does not last then we will proceed with the following:     We informed the patient that we could proceed with arthroscopy but could guarantee no pain relief due to the arthritis in his joint. I also discussed hip resurfacing with the patient and he notes that he would like to think about his options, do some research, and would call us back tomorrow. I informed him that in the interim I will inquire who Dr. Perez would recommend should he want to see a physician to discuss the hip resurfacing procedure.      ASSESSMENT:    Left Hip labral tear.  I have reviewed the MRI .he  has tearing in the labrum.      he would benefit from hip arthroscopy, given the above.      PLAN: We have discussed the surgery and recovery of arthroscopic hip surgery. he understands that there may be limited weightbearing up to several weeks (usually 3 weeks) after surgery depending on procedures that are performed at the time of surgery.     The spectrum of treatment options were discussed with the patient, including nonoperative and operative options.  After thorough discussion, the patient has elected to undergo surgical treatment to include:     left                        a. Hip arthroscopic revision labral repair versus debridement                        b. Hip arthroscopic loose body removal                        c. Hip arthroscopic femoral neck osteoplasty                        d. Hip arthroscopic acetabuloplasty                        e. Hip arthroscopic partial synovectomy/debridement                        f. Hip arthroscopic capsular  patricia                        g.  Hip arthroscopic-assisted Bio-D arthrocentesis        The details of the surgical procedure were explained, including the location of probable incisions and a description of likely hardware and/or grafts to be used.  The patient understands the likely convalescence after surgery.  Also, we have thoroughly discussed the risks, benefits and alternatives to surgery, including, but not limited to, the risk of infection, joint stiffness, skin injury to perineum, heterotopic ossification, arthritis, blood clot (including DVT and/or pulmonary embolus), neurologic and vascular injury.  It was explained that, if tissue has been repaired or reconstructed, there is a chance of failure, which may require further management.       Heterotopic ossification is a known complication of hip arthroscopy and Naprosyn significantly decreases this risk.  According to Wisam in the Journal of Orthopedic Traumatology 2010, the rate of heterotopic ossification for cases with Naprosyn for prophylaxis was 0% and without Naprosyn for prophylaxis was 33%.  Bart at the International Society of Hip Arthroscopy Annual Meeting in 2012 showed similar results with an incidence of heterotopic ossification in patients that did receive Naprosyn prophylaxis was 4.5% versus versus 23.6% in patients who did not receive Naproxen twice a day for three weeks.  Brittany's study in the American Journal of Sports Medicine in 2012 showed the addition of Indocin to the medication protocol (in addition to Naprosyn) decreased the rate of heterotopic ossification from 8.3% to 1.8%.      Patient has chondral damage to hip.  Therefore, I can offer no guarantee whatsoever to the results of a hip arthroscopic surgery and hip arthroscopic surgery would be less predictable and possibility of worsening of condition and need for subsequent hip arthroplasty or further surgery may be needed.  I believe that arthroscopic surgery will benefit  the patient.  I explained this in detail today and patient acknowledged understanding and wishes to proceed.

## 2020-03-10 NOTE — TELEPHONE ENCOUNTER
----- Message from Jemma Guerrero MA sent at 3/10/2020 12:18 PM CDT -----  Patient is scheduled on 3/24/20 Left Hip. PT orders.

## 2020-03-10 NOTE — TELEPHONE ENCOUNTER
----- Message from Jemma Guerrero MA sent at 3/10/2020 11:35 AM CDT -----  Contact: self  Spoke to patient and scheduled surgery date as well as pre-op appointment. Patient has questions about his surgery that he wants a call back. I explained you were in surgery today so it may not be until this afternoon or tomorrow.    Thanks,  Jemma Guerrero MA  Medical Assistant to Dr. Bebe Perez    ----- Message -----  From: Govind Franco MD  Sent: 3/9/2020   5:45 PM CDT  To: Jemma Guerrero MA    Spoke to him. He is looking to get it done as soon as possible    ----- Message -----  From: Jemma Guerrero MA  Sent: 3/9/2020   9:53 AM CDT  To: Govind Franco MD    Please call patient with MRI results so I am able to schedule his surgery.    Thanks,  Jemma Guerrero MA  Medical Assistant to Dr. Bebe Perez    ----- Message -----  From: Soila Benavides  Sent: 3/9/2020   9:40 AM CDT  To: Ana Spence Staff    Pt is calling to schedule appt for surgery. Asking for a call back.      Contact info [phone]

## 2020-03-16 ENCOUNTER — TELEPHONE (OUTPATIENT)
Dept: SPORTS MEDICINE | Facility: CLINIC | Age: 30
End: 2020-03-16

## 2020-03-16 NOTE — TELEPHONE ENCOUNTER
I called the patient to discuss his upcoming surgery and the need to postpone surgery due to the recent COVID concerns. I discussed the concern that should he have any post operative complication or concern warranting them to go to the emergency department following surgery that he will be at risk for possible increased exposure to the COVID virus. We also discussed that should things shut down around town they may not be able to attend physical therapy leading to issues with post operative healing and progression following surgery. The patient verbalized understanding and decided that he would like to postpone surgery at this time and was informed that we will contact him once we are able to reschedule surgery and pre operative appointments.

## 2020-05-07 ENCOUNTER — TELEPHONE (OUTPATIENT)
Dept: SPORTS MEDICINE | Facility: CLINIC | Age: 30
End: 2020-05-07

## 2020-05-07 NOTE — TELEPHONE ENCOUNTER
Spoke to patient and informed him we are working on getting dates available to reschedule surgery. Once we have dates we will call him back.

## 2020-05-07 NOTE — TELEPHONE ENCOUNTER
----- Message from Janie Siegel MA sent at 5/7/2020  8:57 AM CDT -----  Contact: pt  Could you let him know that as soon as I have a date I will be giving him a call please    Janie Siegel   Clinical assistant to Dr. Bebe Perez    ----- Message -----  From: Jemma Guerrero MA  Sent: 5/5/2020  11:22 AM CDT  To: Janie Siegel MA    Rescheduling sx date.  ----- Message -----  From: Claudine Lewis  Sent: 5/5/2020  10:16 AM CDT  To: Ana Spence Staff    Pt would like to be called back regarding  rescheduling surgery  Pt can be reached at [phone]

## 2020-05-08 ENCOUNTER — TELEPHONE (OUTPATIENT)
Dept: SPORTS MEDICINE | Facility: CLINIC | Age: 30
End: 2020-05-08

## 2020-05-08 NOTE — TELEPHONE ENCOUNTER
----- Message from Soila Benavides sent at 5/8/2020  4:18 PM CDT -----  Contact: self  Pt needs to drop off disability paperwork and has questions bout his surgery. It was reschedule due to COVID      Contact info [phone]

## 2020-05-08 NOTE — TELEPHONE ENCOUNTER
Spoke to patient answered his questions about rescheduling his surgery date as well as his paperwork.

## 2020-05-20 ENCOUNTER — TELEPHONE (OUTPATIENT)
Dept: SPORTS MEDICINE | Facility: CLINIC | Age: 30
End: 2020-05-20

## 2020-05-20 DIAGNOSIS — Z01.818 PRE-OP TESTING: Primary | ICD-10-CM

## 2020-05-25 ENCOUNTER — TELEPHONE (OUTPATIENT)
Dept: SPORTS MEDICINE | Facility: CLINIC | Age: 30
End: 2020-05-25

## 2020-05-25 NOTE — TELEPHONE ENCOUNTER
Spoke to patient and informed him that  His paperwork is completed and he can pick it up from the .

## 2020-05-25 NOTE — TELEPHONE ENCOUNTER
----- Message from Marta Manning MA sent at 5/22/2020  4:53 PM CDT -----  Regarding: FMLA Forms  Hey,    Can you give this patient a call Monday morning to let him know his FMLA is complete? It's at the . He can come by to pick it up.    Marta Manning MA  [facility]

## 2020-06-10 ENCOUNTER — LAB VISIT (OUTPATIENT)
Dept: INTERNAL MEDICINE | Facility: CLINIC | Age: 30
End: 2020-06-10
Payer: COMMERCIAL

## 2020-06-10 ENCOUNTER — TELEPHONE (OUTPATIENT)
Dept: PHARMACY | Facility: CLINIC | Age: 30
End: 2020-06-10

## 2020-06-10 ENCOUNTER — OFFICE VISIT (OUTPATIENT)
Dept: SPORTS MEDICINE | Facility: CLINIC | Age: 30
End: 2020-06-10
Payer: COMMERCIAL

## 2020-06-10 VITALS
HEIGHT: 73 IN | BODY MASS INDEX: 25.18 KG/M2 | WEIGHT: 190 LBS | DIASTOLIC BLOOD PRESSURE: 88 MMHG | SYSTOLIC BLOOD PRESSURE: 147 MMHG | HEART RATE: 105 BPM

## 2020-06-10 DIAGNOSIS — M25.9 DISORDER OF HIP JOINT: ICD-10-CM

## 2020-06-10 DIAGNOSIS — Z01.818 PRE-OP TESTING: ICD-10-CM

## 2020-06-10 DIAGNOSIS — S73.192A TEAR OF LEFT ACETABULAR LABRUM, INITIAL ENCOUNTER: Primary | ICD-10-CM

## 2020-06-10 DIAGNOSIS — S73.192A LABRAL TEAR OF LEFT HIP JOINT: ICD-10-CM

## 2020-06-10 DIAGNOSIS — G89.18 POST-OPERATIVE PAIN: ICD-10-CM

## 2020-06-10 PROCEDURE — 99999 PR PBB SHADOW E&M-EST. PATIENT-LVL III: CPT | Mod: PBBFAC,,, | Performed by: PHYSICIAN ASSISTANT

## 2020-06-10 PROCEDURE — 99999 PR PBB SHADOW E&M-EST. PATIENT-LVL III: ICD-10-PCS | Mod: PBBFAC,,, | Performed by: PHYSICIAN ASSISTANT

## 2020-06-10 PROCEDURE — 99499 UNLISTED E&M SERVICE: CPT | Mod: S$GLB,,, | Performed by: PHYSICIAN ASSISTANT

## 2020-06-10 PROCEDURE — 99499 NO LOS: ICD-10-PCS | Mod: S$GLB,,, | Performed by: PHYSICIAN ASSISTANT

## 2020-06-10 PROCEDURE — U0003 INFECTIOUS AGENT DETECTION BY NUCLEIC ACID (DNA OR RNA); SEVERE ACUTE RESPIRATORY SYNDROME CORONAVIRUS 2 (SARS-COV-2) (CORONAVIRUS DISEASE [COVID-19]), AMPLIFIED PROBE TECHNIQUE, MAKING USE OF HIGH THROUGHPUT TECHNOLOGIES AS DESCRIBED BY CMS-2020-01-R: HCPCS

## 2020-06-10 RX ORDER — NAPROXEN 500 MG/1
500 TABLET ORAL EVERY 12 HOURS
Qty: 42 TABLET | Refills: 0 | Status: SHIPPED | OUTPATIENT
Start: 2020-06-10 | End: 2020-07-02

## 2020-06-10 RX ORDER — PREGABALIN 25 MG/1
75 CAPSULE ORAL
Status: CANCELLED | OUTPATIENT
Start: 2020-06-10 | End: 2020-06-10

## 2020-06-10 RX ORDER — OMEPRAZOLE 20 MG/1
20 CAPSULE, DELAYED RELEASE ORAL DAILY
Qty: 30 CAPSULE | Refills: 0 | Status: SHIPPED | OUTPATIENT
Start: 2020-06-10 | End: 2020-07-11

## 2020-06-10 RX ORDER — TRAMADOL HYDROCHLORIDE 50 MG/1
50-100 TABLET ORAL EVERY 6 HOURS PRN
Qty: 21 TABLET | Refills: 0 | Status: SHIPPED | OUTPATIENT
Start: 2020-06-10 | End: 2020-06-15 | Stop reason: SDUPTHER

## 2020-06-10 RX ORDER — OXYCODONE AND ACETAMINOPHEN 10; 325 MG/1; MG/1
TABLET ORAL
Qty: 21 TABLET | Refills: 0 | Status: SHIPPED | OUTPATIENT
Start: 2020-06-10 | End: 2020-06-15 | Stop reason: SDUPTHER

## 2020-06-10 RX ORDER — OXYCODONE HCL 10 MG/1
10 TABLET, FILM COATED, EXTENDED RELEASE ORAL
Status: CANCELLED | OUTPATIENT
Start: 2020-06-10 | End: 2020-06-10

## 2020-06-10 RX ORDER — PROMETHAZINE HYDROCHLORIDE 25 MG/1
25 TABLET ORAL EVERY 6 HOURS PRN
Qty: 12 TABLET | Refills: 0 | Status: SHIPPED | OUTPATIENT
Start: 2020-06-10

## 2020-06-10 RX ORDER — INDOMETHACIN 25 MG/1
75 CAPSULE ORAL DAILY
Qty: 12 CAPSULE | Refills: 0 | Status: SHIPPED | OUTPATIENT
Start: 2020-06-10

## 2020-06-10 RX ORDER — SODIUM CHLORIDE 9 MG/ML
INJECTION, SOLUTION INTRAVENOUS CONTINUOUS
Status: CANCELLED | OUTPATIENT
Start: 2020-06-10

## 2020-06-10 NOTE — H&P (VIEW-ONLY)
Joao Jang  is here for a completion of his perioperative paperwork. he  Is scheduled to undergo     left                        a. Hip arthroscopic labral repair versus debridement                        b. Hip arthroscopic loose body removal                        c. Hip arthroscopic femoral neck osteoplasty                        d. Hip arthroscopic acetabuloplasty                        e. Hip arthroscopic partial synovectomy/debridement                        f. Hip arthroscopic capsular closure on 6/12/20      He is a healthy individual and does not need clearance for this procedure.     PAST MEDICAL HISTORY: History reviewed. No pertinent past medical history.  PAST SURGICAL HISTORY:   Past Surgical History:   Procedure Laterality Date    HIP SURGERY      KNEE ARTHROSCOPY       FAMILY HISTORY:   Family History   Problem Relation Age of Onset    No Known Problems Mother     Hypertension Father      SOCIAL HISTORY:   Social History     Socioeconomic History    Marital status:      Spouse name: Not on file    Number of children: Not on file    Years of education: Not on file    Highest education level: Not on file   Occupational History    Not on file   Social Needs    Financial resource strain: Not on file    Food insecurity:     Worry: Not on file     Inability: Not on file    Transportation needs:     Medical: Not on file     Non-medical: Not on file   Tobacco Use    Smoking status: Never Smoker   Substance and Sexual Activity    Alcohol use: Not on file    Drug use: Not on file    Sexual activity: Not on file   Lifestyle    Physical activity:     Days per week: Not on file     Minutes per session: Not on file    Stress: Not on file   Relationships    Social connections:     Talks on phone: Not on file     Gets together: Not on file     Attends [employer] service: Not on file     Active member of club or organization: Not on file     Attends meetings of clubs or organizations:  "Not on file     Relationship status: Not on file   Other Topics Concern    Not on file   Social History Narrative    Not on file       MEDICATIONS:   Current Outpatient Medications:     hydrocodone-acetaminophen 5-325mg (NORCO) 5-325 mg per tablet, , Disp: , Rfl:     indomethacin (INDOCIN) 25 MG capsule, Take 3 capsules (75 mg total) by mouth once daily. Take with food. Take on post-op days 1,2,3,&4., Disp: 12 capsule, Rfl: 0    lisdexamfetamine (VYVANSE) 50 MG capsule, Take 50 mg by mouth every morning., Disp: , Rfl:     naproxen (NAPROSYN) 500 MG tablet, Take 1 tablet (500 mg total) by mouth every 12 (twelve) hours. Take with food. Starting on post-op day 5 for 21 days, Disp: 42 tablet, Rfl: 0    omeprazole (PRILOSEC) 20 MG capsule, Take 1 capsule (20 mg total) by mouth once daily., Disp: 30 capsule, Rfl: 0    oxyCODONE-acetaminophen (PERCOCET)  mg per tablet, Take 1 tablet by mouth every 4-6 hours as needed for pain. Take stool softener with this medication., Disp: 21 tablet, Rfl: 0    pantoprazole (PROTONIX) 20 MG tablet, Take 20 mg by mouth once daily., Disp: , Rfl:     promethazine (PHENERGAN) 25 MG tablet, Take 1 tablet (25 mg total) by mouth every 6 (six) hours as needed for Nausea., Disp: 12 tablet, Rfl: 0    tizanidine (ZANAFLEX) 4 MG tablet, , Disp: , Rfl:     traMADoL (ULTRAM) 50 mg tablet, Take 1-2 tablets ( mg total) by mouth every 6 (six) hours as needed., Disp: 21 tablet, Rfl: 0  ALLERGIES:   Review of patient's allergies indicates:   Allergen Reactions    Valium [diazepam]        VITAL SIGNS: BP (!) 147/88   Pulse 105   Ht 6' 1" (1.854 m)   Wt 86.2 kg (190 lb)   BMI 25.07 kg/m²      Risks, indications and benefits of the surgical procedure were discussed with the patient. All questions with regard to surgery, rehab, expected return to functional activities, activities of daily living and recreational endeavors were answered to his satisfaction.    It was explained to the " patient that there may be an increase in surgical risks if the patient has certain co-morbidities such as but not limited to: Obesity, Cardiovascular issues (CHF, CAD, Arrhythmias), chronic pulmonary issues, previous or current neurovascular/neurological issues, previous strokes, diabetes mellitus, previous wound healing issues, previous wound or skin infections, PVD, clotting disorders, if the patient uses chronic steroids, if the patient takes or has immune compromising medications or diseases, or has previously or currently used tobacco products.     The patient verbalized that he/she does not have any additional clotting, bleeding, or blood disorders, other than what is list in her chart on today's review.     Then a brief history and physical exam were performed.    Review of Systems   Constitution: Negative. Negative for chills, fever and night sweats.   HENT: Negative for congestion and headaches.    Eyes: Negative for blurred vision, left vision loss and right vision loss.   Cardiovascular: Negative for chest pain and syncope.   Respiratory: Negative for cough and shortness of breath.    Endocrine: Negative for polydipsia, polyphagia and polyuria.   Hematologic/Lymphatic: Negative for bleeding problem. Does not bruise/bleed easily.   Skin: Negative for dry skin, itching and rash.   Musculoskeletal: Negative for falls and muscle weakness.   Gastrointestinal: Negative for abdominal pain and bowel incontinence.   Genitourinary: Negative for bladder incontinence and nocturia.   Neurological: Negative for disturbances in coordination, loss of balance and seizures.   Psychiatric/Behavioral: Negative for depression. The patient does not have insomnia.    Allergic/Immunologic: Negative for hives and persistent infections.     PHYSICAL EXAM:  GEN: A&Ox3, WD WN NAD  HEENT: WNL  CHEST: CTAB, no W/R/R  HEART: RRR, no M/R/G  ABD: Soft, NT ND, BS x4 QUADS  MS; See Epic  NEURO: CN II-XII intact       The surgical consent  was then reviewed with the patient, who agreed with all the contents of the consent form and it was signed. he was then given the surgery packet to bring with him to [facility] for the anesthesia portion of his perioperative paperwork (if needed)   For all physicians except for Dr. Bose, we will email and possibly fax the consent forms and booking sheets to [facility].    The patient was given the opportunity to ask questions about the surgical plan and consent form, and once no other questions were asked, I proceeded with the pre-op appointment.    PHYSICAL THERAPY:  He was also instructed regarding physical therapy and will begin on  POD1. He was given a copy of the original prescription to schedule. Another copy of this prescription was also faxed to [facility].    POST OP CARE:instructions were reviewed including care of the wound and dressing after surgery and when he can shower.     CRUTCHES OR WALKER: It was explained to the patient that if they are having a lower extremity surgery that they will require either a walker or crutches to ambulate safely with after surgery. It was explained that a cane or other assistive devices are not sufficient to safely ambulate with after surgery. I explained to the patient that I will place an order for them to receive either crutches or a walker after surgery to go home with. It was explained that if they have crutches or a walker at home already, that they are REQUIRED to bring them to the hospital on the day of surgery. It was explained that if they do not have them at the hospital on the day of surgery that they WILL be provided a new pair or crutches or a walker to go home with to ensure ambulation will be safe if the patient needs to stop somewhere on the way home.      PAIN MANAGEMENT: Joao Jang was also given a pain management regime, which includes the TENS unit given to him by emiliano along with the education required for its  use. He was also instructed regarding the Polar ice unit that will be in place after surgery and his postoperative pain medications.     PAIN MEDICATION:  Percocet 10/325mg 1 po q 4-6 hours prn pain  Ultram 50 mg Take 1-2 p.o. q.6 hours p.r.n. breakthrough pain,   Phenergan 25 mg one p.o. q.6 hours p.r.n. nausea and vomiting.    Indocin 75mg po x 4 days  Naproxen 500mg po BID x 21 days (starting on POD5)  Prilosec 40mg x 25 days for stomach protection    The patient will only require mechanical DVT prophylaxis with TEDs, SCDs, and early ambulation following surgery. Patient does NOT require aspirin for DVT prophylaxis.    This was discussed with the patient. The patient was questioned about their risk factors for developing DVTs including if there was any history of DVTs. The patient was asked if any specific recommendations were given from the doctor/s that did pre-operative surgical clearance. The patient was then given an education sheet about DVTs and PE with warning signs and symptoms of both and steps to take if they suspect either of these.     Patient denies history of seizures.     This along with the Modified Caprini risk assessment model for VTE in general surgical patients was used to determine the patient's DVT risk.     From: Maria C MK, Rui DA, Christine SM, et al. Prevention of VTE in nonorthopedic surgical patients: antithrombotic therapy and prevention of thrombosis, 9th ed: American College of Chest Physicians evidence-based clinical practical guidelines. Chest 2012; 141:e227S. Copyright © 2012. Reproduced with permission from the American College of Chest Physicians.    The patient was told that narcotic pain medications may make them drowsy and instructions were given to not sign legal documents, drive or operate heavy machinery, cars, or equipment while under the influence of narcotic medications. The patient was told and understands that narcotic pain medications should only be used as needed to  control pain and that other options of pain control include TENs unit and ice packs/unit.     As there were no other questions to be asked, he was given my business card along with Bebe Perez MD business card if he has any questions or concerns prior to surgery or in the postop period.     As per the guidelines from [facility], this patient's condition is considered time sensitive or an urgent issue. The visit could not be done via telemedicine. The patient meets the current criteria to be evaluate and treated in person at this time. Treatment performed during this visit was medically necessary is to avoid further harm to the patient's underlying condition.

## 2020-06-10 NOTE — H&P
Joao Jang  is here for a completion of his perioperative paperwork. he  Is scheduled to undergo     left                        a. Hip arthroscopic labral repair versus debridement                        b. Hip arthroscopic loose body removal                        c. Hip arthroscopic femoral neck osteoplasty                        d. Hip arthroscopic acetabuloplasty                        e. Hip arthroscopic partial synovectomy/debridement                        f. Hip arthroscopic capsular closure on 6/12/20      He is a healthy individual and does not need clearance for this procedure.     PAST MEDICAL HISTORY: History reviewed. No pertinent past medical history.  PAST SURGICAL HISTORY:   Past Surgical History:   Procedure Laterality Date    HIP SURGERY      KNEE ARTHROSCOPY       FAMILY HISTORY:   Family History   Problem Relation Age of Onset    No Known Problems Mother     Hypertension Father      SOCIAL HISTORY:   Social History     Socioeconomic History    Marital status:      Spouse name: Not on file    Number of children: Not on file    Years of education: Not on file    Highest education level: Not on file   Occupational History    Not on file   Social Needs    Financial resource strain: Not on file    Food insecurity:     Worry: Not on file     Inability: Not on file    Transportation needs:     Medical: Not on file     Non-medical: Not on file   Tobacco Use    Smoking status: Never Smoker   Substance and Sexual Activity    Alcohol use: Not on file    Drug use: Not on file    Sexual activity: Not on file   Lifestyle    Physical activity:     Days per week: Not on file     Minutes per session: Not on file    Stress: Not on file   Relationships    Social connections:     Talks on phone: Not on file     Gets together: Not on file     Attends Mandaen service: Not on file     Active member of club or organization: Not on file     Attends meetings of clubs or organizations:  "Not on file     Relationship status: Not on file   Other Topics Concern    Not on file   Social History Narrative    Not on file       MEDICATIONS:   Current Outpatient Medications:     hydrocodone-acetaminophen 5-325mg (NORCO) 5-325 mg per tablet, , Disp: , Rfl:     indomethacin (INDOCIN) 25 MG capsule, Take 3 capsules (75 mg total) by mouth once daily. Take with food. Take on post-op days 1,2,3,&4., Disp: 12 capsule, Rfl: 0    lisdexamfetamine (VYVANSE) 50 MG capsule, Take 50 mg by mouth every morning., Disp: , Rfl:     naproxen (NAPROSYN) 500 MG tablet, Take 1 tablet (500 mg total) by mouth every 12 (twelve) hours. Take with food. Starting on post-op day 5 for 21 days, Disp: 42 tablet, Rfl: 0    omeprazole (PRILOSEC) 20 MG capsule, Take 1 capsule (20 mg total) by mouth once daily., Disp: 30 capsule, Rfl: 0    oxyCODONE-acetaminophen (PERCOCET)  mg per tablet, Take 1 tablet by mouth every 4-6 hours as needed for pain. Take stool softener with this medication., Disp: 21 tablet, Rfl: 0    pantoprazole (PROTONIX) 20 MG tablet, Take 20 mg by mouth once daily., Disp: , Rfl:     promethazine (PHENERGAN) 25 MG tablet, Take 1 tablet (25 mg total) by mouth every 6 (six) hours as needed for Nausea., Disp: 12 tablet, Rfl: 0    tizanidine (ZANAFLEX) 4 MG tablet, , Disp: , Rfl:     traMADoL (ULTRAM) 50 mg tablet, Take 1-2 tablets ( mg total) by mouth every 6 (six) hours as needed., Disp: 21 tablet, Rfl: 0  ALLERGIES:   Review of patient's allergies indicates:   Allergen Reactions    Valium [diazepam]        VITAL SIGNS: BP (!) 147/88   Pulse 105   Ht 6' 1" (1.854 m)   Wt 86.2 kg (190 lb)   BMI 25.07 kg/m²      Risks, indications and benefits of the surgical procedure were discussed with the patient. All questions with regard to surgery, rehab, expected return to functional activities, activities of daily living and recreational endeavors were answered to his satisfaction.    It was explained to the " patient that there may be an increase in surgical risks if the patient has certain co-morbidities such as but not limited to: Obesity, Cardiovascular issues (CHF, CAD, Arrhythmias), chronic pulmonary issues, previous or current neurovascular/neurological issues, previous strokes, diabetes mellitus, previous wound healing issues, previous wound or skin infections, PVD, clotting disorders, if the patient uses chronic steroids, if the patient takes or has immune compromising medications or diseases, or has previously or currently used tobacco products.     The patient verbalized that he/she does not have any additional clotting, bleeding, or blood disorders, other than what is list in her chart on today's review.     Then a brief history and physical exam were performed.    Review of Systems   Constitution: Negative. Negative for chills, fever and night sweats.   HENT: Negative for congestion and headaches.    Eyes: Negative for blurred vision, left vision loss and right vision loss.   Cardiovascular: Negative for chest pain and syncope.   Respiratory: Negative for cough and shortness of breath.    Endocrine: Negative for polydipsia, polyphagia and polyuria.   Hematologic/Lymphatic: Negative for bleeding problem. Does not bruise/bleed easily.   Skin: Negative for dry skin, itching and rash.   Musculoskeletal: Negative for falls and muscle weakness.   Gastrointestinal: Negative for abdominal pain and bowel incontinence.   Genitourinary: Negative for bladder incontinence and nocturia.   Neurological: Negative for disturbances in coordination, loss of balance and seizures.   Psychiatric/Behavioral: Negative for depression. The patient does not have insomnia.    Allergic/Immunologic: Negative for hives and persistent infections.     PHYSICAL EXAM:  GEN: A&Ox3, WD WN NAD  HEENT: WNL  CHEST: CTAB, no W/R/R  HEART: RRR, no M/R/G  ABD: Soft, NT ND, BS x4 QUADS  MS; See Epic  NEURO: CN II-XII intact       The surgical consent  was then reviewed with the patient, who agreed with all the contents of the consent form and it was signed. he was then given the surgery packet to bring with him to [facility] for the anesthesia portion of his perioperative paperwork (if needed)   For all physicians except for Dr. Bose, we will email and possibly fax the consent forms and booking sheets to [facility].    The patient was given the opportunity to ask questions about the surgical plan and consent form, and once no other questions were asked, I proceeded with the pre-op appointment.    PHYSICAL THERAPY:  He was also instructed regarding physical therapy and will begin on  POD1. He was given a copy of the original prescription to schedule. Another copy of this prescription was also faxed to [facility].    POST OP CARE:instructions were reviewed including care of the wound and dressing after surgery and when he can shower.     CRUTCHES OR WALKER: It was explained to the patient that if they are having a lower extremity surgery that they will require either a walker or crutches to ambulate safely with after surgery. It was explained that a cane or other assistive devices are not sufficient to safely ambulate with after surgery. I explained to the patient that I will place an order for them to receive either crutches or a walker after surgery to go home with. It was explained that if they have crutches or a walker at home already, that they are REQUIRED to bring them to the hospital on the day of surgery. It was explained that if they do not have them at the hospital on the day of surgery that they WILL be provided a new pair or crutches or a walker to go home with to ensure ambulation will be safe if the patient needs to stop somewhere on the way home.      PAIN MANAGEMENT: Joao Jang was also given a pain management regime, which includes the TENS unit given to him by emiliano along with the education required for its  use. He was also instructed regarding the Polar ice unit that will be in place after surgery and his postoperative pain medications.     PAIN MEDICATION:  Percocet 10/325mg 1 po q 4-6 hours prn pain  Ultram 50 mg Take 1-2 p.o. q.6 hours p.r.n. breakthrough pain,   Phenergan 25 mg one p.o. q.6 hours p.r.n. nausea and vomiting.    Indocin 75mg po x 4 days  Naproxen 500mg po BID x 21 days (starting on POD5)  Prilosec 40mg x 25 days for stomach protection    The patient will only require mechanical DVT prophylaxis with TEDs, SCDs, and early ambulation following surgery. Patient does NOT require aspirin for DVT prophylaxis.    This was discussed with the patient. The patient was questioned about their risk factors for developing DVTs including if there was any history of DVTs. The patient was asked if any specific recommendations were given from the doctor/s that did pre-operative surgical clearance. The patient was then given an education sheet about DVTs and PE with warning signs and symptoms of both and steps to take if they suspect either of these.     Patient denies history of seizures.     This along with the Modified Caprini risk assessment model for VTE in general surgical patients was used to determine the patient's DVT risk.     From: Maria C MK, Rui DA, Christine SM, et al. Prevention of VTE in nonorthopedic surgical patients: antithrombotic therapy and prevention of thrombosis, 9th ed: American College of [employer]icians evidence-based clinical practical guidelines. Chest 2012; 141:e227S. Copyright © 2012. Reproduced with permission from the American College of Chest Physicians.    The patient was told that narcotic pain medications may make them drowsy and instructions were given to not sign legal documents, drive or operate heavy machinery, cars, or equipment while under the influence of narcotic medications. The patient was told and understands that narcotic pain medications should only be used as needed to  control pain and that other options of pain control include TENs unit and ice packs/unit.     As there were no other questions to be asked, he was given my business card along with Bebe Perez MD business card if he has any questions or concerns prior to surgery or in the postop period.     As per the guidelines from [facility], this patient's condition is considered time sensitive or an urgent issue. The visit could not be done via telemedicine. The patient meets the current criteria to be evaluate and treated in person at this time. Treatment performed during this visit was medically necessary is to avoid further harm to the patient's underlying condition.

## 2020-06-11 LAB — SARS-COV-2 RNA RESP QL NAA+PROBE: NOT DETECTED

## 2020-06-11 NOTE — ANESTHESIA PAT ROS NOTE
06/11/2020  Joao Jang is a 29 y.o., male.      Pre-op Assessment    I have reviewed the Patient Summary Reports.       I have reviewed the Medications.     Review of Systems  Anesthesia Hx:  No problems with previous Anesthesia None on file with [facility]   Social:  Non-Smoker    Hematology/Oncology:  Hematology Normal   Oncology Normal     EENT/Dental:EENT/Dental Normal   Cardiovascular:   Exercise tolerance: good Denies MI.  Denies CAD.     Denies Angina.    Pulmonary:  Pulmonary Normal  Denies Shortness of breath.  Denies Recent URI.  Denies Sleep Apnea.    Renal/:  Renal/ Normal     Hepatic/GI:  Hepatic/GI Normal    Musculoskeletal:   Previous hip scope   Neurological:  Neurology Normal    Endocrine:  Endocrine Normal    Dermatological:  Skin Normal    Psych:   ADHD            Anesthesia Assessment: Preoperative EQUATION    Planned Procedure: Procedure(s) (LRB):  FEMOROPLASTY, ARTHROSCOPIC (Left)  ACETABULOPLASTY (Left)  SYNOVECTOMY, HIP (Left)  REPAIR, Capsular Closure, Hip (Left)  Requested Anesthesia Type:General  Surgeon: Bebe Perez MD  Service: Orthopedics  Known or anticipated Date of Surgery:6/12/2020    Surgeon notes: reviewed    Electronic QUestionnaire Assessment completed via nurse interview with patient.        Triage considerations:     The patient has no apparent active cardiac condition (No unstable coronary Syndrome such as severe unstable angina or recent [<1 month] myocardial infarction, decompensated CHF, severe valvular   disease or significant arrhythmia)    Previous anesthesia records:Not available    Other important co-morbidities: ADHD     Tests already available:  EKG 2018; Covid negative             Instructions given. (See in Nurse's note)    Optimization:       Patient  has previously scheduled Medical Appointment:    Navigation:                Straight Line to  surgery.               No tests, anesthesia preop clinic visit, or consult required.

## 2020-06-12 ENCOUNTER — HOSPITAL ENCOUNTER (OUTPATIENT)
Facility: HOSPITAL | Age: 30
Discharge: HOME | End: 2020-06-12
Attending: ORTHOPAEDIC SURGERY | Admitting: ORTHOPAEDIC SURGERY
Payer: COMMERCIAL

## 2020-06-12 ENCOUNTER — ANESTHESIA EVENT (OUTPATIENT)
Dept: SURGERY | Facility: HOSPITAL | Age: 30
End: 2020-06-12
Payer: COMMERCIAL

## 2020-06-12 ENCOUNTER — ANESTHESIA (OUTPATIENT)
Dept: SURGERY | Facility: HOSPITAL | Age: 30
End: 2020-06-12
Payer: COMMERCIAL

## 2020-06-12 DIAGNOSIS — S73.192A TEAR OF LEFT ACETABULAR LABRUM, INITIAL ENCOUNTER: ICD-10-CM

## 2020-06-12 DIAGNOSIS — S73.192A LABRAL TEAR OF LEFT HIP JOINT: Primary | ICD-10-CM

## 2020-06-12 DIAGNOSIS — M25.552 LEFT HIP PAIN: ICD-10-CM

## 2020-06-12 DIAGNOSIS — M25.9 DISORDER OF HIP JOINT: ICD-10-CM

## 2020-06-12 PROCEDURE — 25000003 PHARM REV CODE 250: Performed by: ORTHOPAEDIC SURGERY

## 2020-06-12 PROCEDURE — 63600175 PHARM REV CODE 636 W HCPCS: Performed by: ORTHOPAEDIC SURGERY

## 2020-06-12 PROCEDURE — 63600175 PHARM REV CODE 636 W HCPCS: Performed by: NURSE ANESTHETIST, CERTIFIED REGISTERED

## 2020-06-12 PROCEDURE — 63600175 PHARM REV CODE 636 W HCPCS: Performed by: ANESTHESIOLOGY

## 2020-06-12 PROCEDURE — 88304 TISSUE EXAM BY PATHOLOGIST: CPT | Performed by: PATHOLOGY

## 2020-06-12 PROCEDURE — 88304 TISSUE EXAM BY PATHOLOGIST: CPT | Mod: 26,,, | Performed by: PATHOLOGY

## 2020-06-12 PROCEDURE — 71000015 HC POSTOP RECOV 1ST HR: Performed by: ORTHOPAEDIC SURGERY

## 2020-06-12 PROCEDURE — 25000003 PHARM REV CODE 250: Performed by: NURSE ANESTHETIST, CERTIFIED REGISTERED

## 2020-06-12 PROCEDURE — 88304 PR  SURG PATH,LEVEL III: ICD-10-PCS | Mod: 26,,, | Performed by: PATHOLOGY

## 2020-06-12 PROCEDURE — 94760 N-INVAS EAR/PLS OXIMETRY 1: CPT

## 2020-06-12 PROCEDURE — 25000003 PHARM REV CODE 250: Performed by: PHYSICIAN ASSISTANT

## 2020-06-12 PROCEDURE — C1713 ANCHOR/SCREW BN/BN,TIS/BN: HCPCS | Performed by: ORTHOPAEDIC SURGERY

## 2020-06-12 PROCEDURE — 88311 PR  DECALCIFY TISSUE: ICD-10-PCS | Mod: 26,,, | Performed by: PATHOLOGY

## 2020-06-12 PROCEDURE — 37000008 HC ANESTHESIA 1ST 15 MINUTES: Performed by: ORTHOPAEDIC SURGERY

## 2020-06-12 PROCEDURE — 71000033 HC RECOVERY, INTIAL HOUR: Performed by: ORTHOPAEDIC SURGERY

## 2020-06-12 PROCEDURE — 99900035 HC TECH TIME PER 15 MIN (STAT)

## 2020-06-12 PROCEDURE — 29914 PR ARTHROSCOPY HIP W/FEMOROPLASTY: ICD-10-PCS | Mod: 51,LT,, | Performed by: ORTHOPAEDIC SURGERY

## 2020-06-12 PROCEDURE — S0028 INJECTION, FAMOTIDINE, 20 MG: HCPCS | Performed by: NURSE ANESTHETIST, CERTIFIED REGISTERED

## 2020-06-12 PROCEDURE — 71000039 HC RECOVERY, EACH ADD'L HOUR: Performed by: ORTHOPAEDIC SURGERY

## 2020-06-12 PROCEDURE — 88311 DECALCIFY TISSUE: CPT | Mod: 26,,, | Performed by: PATHOLOGY

## 2020-06-12 PROCEDURE — D9220A PRA ANESTHESIA: Mod: ANES,,, | Performed by: ANESTHESIOLOGY

## 2020-06-12 PROCEDURE — 88311 DECALCIFY TISSUE: CPT | Performed by: PATHOLOGY

## 2020-06-12 PROCEDURE — 36000710: Performed by: ORTHOPAEDIC SURGERY

## 2020-06-12 PROCEDURE — 94761 N-INVAS EAR/PLS OXIMETRY MLT: CPT

## 2020-06-12 PROCEDURE — D9220A PRA ANESTHESIA: ICD-10-PCS | Mod: CRNA,,, | Performed by: NURSE ANESTHETIST, CERTIFIED REGISTERED

## 2020-06-12 PROCEDURE — 25000003 PHARM REV CODE 250: Performed by: ANESTHESIOLOGY

## 2020-06-12 PROCEDURE — 36000711: Performed by: ORTHOPAEDIC SURGERY

## 2020-06-12 PROCEDURE — 37000009 HC ANESTHESIA EA ADD 15 MINS: Performed by: ORTHOPAEDIC SURGERY

## 2020-06-12 PROCEDURE — 29915 HIP ARTHRO ACETABULOPLASTY: CPT | Mod: 22,LT,, | Performed by: ORTHOPAEDIC SURGERY

## 2020-06-12 PROCEDURE — 29914 HIP ARTHRO W/FEMOROPLASTY: CPT | Mod: 51,LT,, | Performed by: ORTHOPAEDIC SURGERY

## 2020-06-12 PROCEDURE — D9220A PRA ANESTHESIA: ICD-10-PCS | Mod: ANES,,, | Performed by: ANESTHESIOLOGY

## 2020-06-12 PROCEDURE — 27201423 OPTIME MED/SURG SUP & DEVICES STERILE SUPPLY: Performed by: ORTHOPAEDIC SURGERY

## 2020-06-12 PROCEDURE — 29915 PR ARTHROSCOPY HIP W/ACETABULOPLASTY: ICD-10-PCS | Mod: 22,LT,, | Performed by: ORTHOPAEDIC SURGERY

## 2020-06-12 PROCEDURE — D9220A PRA ANESTHESIA: Mod: CRNA,,, | Performed by: NURSE ANESTHETIST, CERTIFIED REGISTERED

## 2020-06-12 DEVICE — ANCHOR SUT HIP 2.9X15.5MM: Type: IMPLANTABLE DEVICE | Site: HIP | Status: FUNCTIONAL

## 2020-06-12 RX ORDER — ROPIVACAINE HYDROCHLORIDE 5 MG/ML
INJECTION, SOLUTION EPIDURAL; INFILTRATION; PERINEURAL
Status: DISCONTINUED | OUTPATIENT
Start: 2020-06-12 | End: 2020-06-12 | Stop reason: HOSPADM

## 2020-06-12 RX ORDER — CEFAZOLIN SODIUM 1 G/3ML
INJECTION, POWDER, FOR SOLUTION INTRAMUSCULAR; INTRAVENOUS
Status: DISCONTINUED | OUTPATIENT
Start: 2020-06-12 | End: 2020-06-12

## 2020-06-12 RX ORDER — MIDAZOLAM HYDROCHLORIDE 1 MG/ML
INJECTION, SOLUTION INTRAMUSCULAR; INTRAVENOUS
Status: DISCONTINUED | OUTPATIENT
Start: 2020-06-12 | End: 2020-06-12

## 2020-06-12 RX ORDER — ONDANSETRON 2 MG/ML
4 INJECTION INTRAMUSCULAR; INTRAVENOUS EVERY 12 HOURS PRN
Status: DISCONTINUED | OUTPATIENT
Start: 2020-06-12 | End: 2020-06-12 | Stop reason: HOSPADM

## 2020-06-12 RX ORDER — KETAMINE HYDROCHLORIDE 100 MG/ML
INJECTION, SOLUTION INTRAMUSCULAR; INTRAVENOUS
Status: DISCONTINUED | OUTPATIENT
Start: 2020-06-12 | End: 2020-06-12 | Stop reason: HOSPADM

## 2020-06-12 RX ORDER — CEFAZOLIN SODIUM 1 G/3ML
2 INJECTION, POWDER, FOR SOLUTION INTRAMUSCULAR; INTRAVENOUS
Status: DISCONTINUED | OUTPATIENT
Start: 2020-06-12 | End: 2020-06-12 | Stop reason: HOSPADM

## 2020-06-12 RX ORDER — OXYCODONE HCL 10 MG/1
10 TABLET, FILM COATED, EXTENDED RELEASE ORAL
Status: COMPLETED | OUTPATIENT
Start: 2020-06-12 | End: 2020-06-12

## 2020-06-12 RX ORDER — SODIUM CHLORIDE 0.9 % (FLUSH) 0.9 %
10 SYRINGE (ML) INJECTION
Status: DISCONTINUED | OUTPATIENT
Start: 2020-06-12 | End: 2020-06-12 | Stop reason: HOSPADM

## 2020-06-12 RX ORDER — ROCURONIUM BROMIDE 10 MG/ML
INJECTION, SOLUTION INTRAVENOUS
Status: DISCONTINUED | OUTPATIENT
Start: 2020-06-12 | End: 2020-06-12

## 2020-06-12 RX ORDER — SODIUM CHLORIDE 9 MG/ML
INJECTION, SOLUTION INTRAVENOUS CONTINUOUS
Status: DISCONTINUED | OUTPATIENT
Start: 2020-06-12 | End: 2020-06-12 | Stop reason: HOSPADM

## 2020-06-12 RX ORDER — OXYCODONE HYDROCHLORIDE 5 MG/1
10 TABLET ORAL EVERY 4 HOURS PRN
Status: DISCONTINUED | OUTPATIENT
Start: 2020-06-12 | End: 2020-06-12 | Stop reason: HOSPADM

## 2020-06-12 RX ORDER — FENTANYL CITRATE 50 UG/ML
INJECTION, SOLUTION INTRAMUSCULAR; INTRAVENOUS
Status: DISCONTINUED | OUTPATIENT
Start: 2020-06-12 | End: 2020-06-12

## 2020-06-12 RX ORDER — NEOSTIGMINE METHYLSULFATE 1 MG/ML
INJECTION, SOLUTION INTRAVENOUS
Status: DISCONTINUED | OUTPATIENT
Start: 2020-06-12 | End: 2020-06-12

## 2020-06-12 RX ORDER — TRAMADOL HYDROCHLORIDE 50 MG/1
100 TABLET ORAL EVERY 6 HOURS PRN
Status: DISCONTINUED | OUTPATIENT
Start: 2020-06-12 | End: 2020-06-12 | Stop reason: HOSPADM

## 2020-06-12 RX ORDER — PROMETHAZINE HYDROCHLORIDE 25 MG/1
25 TABLET ORAL EVERY 6 HOURS PRN
Status: DISCONTINUED | OUTPATIENT
Start: 2020-06-12 | End: 2020-06-12 | Stop reason: HOSPADM

## 2020-06-12 RX ORDER — DEXMEDETOMIDINE HYDROCHLORIDE 100 UG/ML
INJECTION, SOLUTION INTRAVENOUS
Status: DISCONTINUED | OUTPATIENT
Start: 2020-06-12 | End: 2020-06-12

## 2020-06-12 RX ORDER — PREGABALIN 75 MG/1
75 CAPSULE ORAL
Status: COMPLETED | OUTPATIENT
Start: 2020-06-12 | End: 2020-06-12

## 2020-06-12 RX ORDER — FENTANYL CITRATE 50 UG/ML
25 INJECTION, SOLUTION INTRAMUSCULAR; INTRAVENOUS EVERY 5 MIN PRN
Status: COMPLETED | OUTPATIENT
Start: 2020-06-12 | End: 2020-06-12

## 2020-06-12 RX ORDER — SODIUM CHLORIDE 9 MG/ML
INJECTION, SOLUTION INTRAVENOUS CONTINUOUS PRN
Status: DISCONTINUED | OUTPATIENT
Start: 2020-06-12 | End: 2020-06-12

## 2020-06-12 RX ORDER — DEXAMETHASONE SODIUM PHOSPHATE 4 MG/ML
INJECTION, SOLUTION INTRA-ARTICULAR; INTRALESIONAL; INTRAMUSCULAR; INTRAVENOUS; SOFT TISSUE
Status: DISCONTINUED | OUTPATIENT
Start: 2020-06-12 | End: 2020-06-12

## 2020-06-12 RX ORDER — EPINEPHRINE 1 MG/ML
INJECTION, SOLUTION INTRACARDIAC; INTRAMUSCULAR; INTRAVENOUS; SUBCUTANEOUS
Status: DISCONTINUED | OUTPATIENT
Start: 2020-06-12 | End: 2020-06-12 | Stop reason: HOSPADM

## 2020-06-12 RX ORDER — GLYCOPYRROLATE 0.2 MG/ML
INJECTION INTRAMUSCULAR; INTRAVENOUS
Status: DISCONTINUED | OUTPATIENT
Start: 2020-06-12 | End: 2020-06-12

## 2020-06-12 RX ORDER — METHOCARBAMOL 500 MG/1
1000 TABLET, FILM COATED ORAL ONCE
Status: COMPLETED | OUTPATIENT
Start: 2020-06-12 | End: 2020-06-12

## 2020-06-12 RX ORDER — FAMOTIDINE 10 MG/ML
INJECTION INTRAVENOUS
Status: DISCONTINUED | OUTPATIENT
Start: 2020-06-12 | End: 2020-06-12

## 2020-06-12 RX ORDER — MORPHINE SULFATE 2 MG/ML
2 INJECTION, SOLUTION INTRAMUSCULAR; INTRAVENOUS EVERY 10 MIN PRN
Status: DISCONTINUED | OUTPATIENT
Start: 2020-06-12 | End: 2020-06-12 | Stop reason: HOSPADM

## 2020-06-12 RX ORDER — OXYCODONE HYDROCHLORIDE 5 MG/1
5 TABLET ORAL
Status: DISCONTINUED | OUTPATIENT
Start: 2020-06-12 | End: 2020-06-12 | Stop reason: HOSPADM

## 2020-06-12 RX ORDER — KETAMINE HCL IN 0.9 % NACL 50 MG/5 ML
SYRINGE (ML) INTRAVENOUS
Status: DISCONTINUED | OUTPATIENT
Start: 2020-06-12 | End: 2020-06-12

## 2020-06-12 RX ORDER — ONDANSETRON 2 MG/ML
INJECTION INTRAMUSCULAR; INTRAVENOUS
Status: DISCONTINUED | OUTPATIENT
Start: 2020-06-12 | End: 2020-06-12

## 2020-06-12 RX ORDER — ONDANSETRON 2 MG/ML
4 INJECTION INTRAMUSCULAR; INTRAVENOUS DAILY PRN
Status: DISCONTINUED | OUTPATIENT
Start: 2020-06-12 | End: 2020-06-12 | Stop reason: HOSPADM

## 2020-06-12 RX ORDER — PROPOFOL 10 MG/ML
VIAL (ML) INTRAVENOUS
Status: DISCONTINUED | OUTPATIENT
Start: 2020-06-12 | End: 2020-06-12

## 2020-06-12 RX ORDER — KETOROLAC TROMETHAMINE 30 MG/ML
INJECTION, SOLUTION INTRAMUSCULAR; INTRAVENOUS
Status: DISCONTINUED | OUTPATIENT
Start: 2020-06-12 | End: 2020-06-12 | Stop reason: HOSPADM

## 2020-06-12 RX ORDER — LIDOCAINE HYDROCHLORIDE 10 MG/ML
INJECTION, SOLUTION INTRAVENOUS
Status: DISCONTINUED | OUTPATIENT
Start: 2020-06-12 | End: 2020-06-12

## 2020-06-12 RX ADMIN — LIDOCAINE HYDROCHLORIDE 100 MG: 10 INJECTION, SOLUTION INTRAVENOUS at 07:06

## 2020-06-12 RX ADMIN — PROPOFOL 250 MG: 10 INJECTION, EMULSION INTRAVENOUS at 07:06

## 2020-06-12 RX ADMIN — METHOCARBAMOL TABLETS 1000 MG: 500 TABLET, COATED ORAL at 11:06

## 2020-06-12 RX ADMIN — DEXMEDETOMIDINE HYDROCHLORIDE 20 MCG: 100 INJECTION, SOLUTION, CONCENTRATE INTRAVENOUS at 07:06

## 2020-06-12 RX ADMIN — FENTANYL CITRATE 25 MCG: 50 INJECTION INTRAMUSCULAR; INTRAVENOUS at 10:06

## 2020-06-12 RX ADMIN — MIDAZOLAM 2 MG: 1 INJECTION INTRAMUSCULAR; INTRAVENOUS at 06:06

## 2020-06-12 RX ADMIN — SODIUM CHLORIDE, SODIUM GLUCONATE, SODIUM ACETATE, POTASSIUM CHLORIDE, MAGNESIUM CHLORIDE, SODIUM PHOSPHATE, DIBASIC, AND POTASSIUM PHOSPHATE: .53; .5; .37; .037; .03; .012; .00082 INJECTION, SOLUTION INTRAVENOUS at 07:06

## 2020-06-12 RX ADMIN — NEOSTIGMINE METHYLSULFATE 4 MG: 1 INJECTION INTRAVENOUS at 10:06

## 2020-06-12 RX ADMIN — SODIUM CHLORIDE: 0.9 INJECTION, SOLUTION INTRAVENOUS at 06:06

## 2020-06-12 RX ADMIN — FENTANYL CITRATE 50 MCG: 50 INJECTION, SOLUTION INTRAMUSCULAR; INTRAVENOUS at 09:06

## 2020-06-12 RX ADMIN — OXYCODONE HYDROCHLORIDE 10 MG: 10 TABLET, FILM COATED, EXTENDED RELEASE ORAL at 06:06

## 2020-06-12 RX ADMIN — FENTANYL CITRATE 50 MCG: 50 INJECTION, SOLUTION INTRAMUSCULAR; INTRAVENOUS at 08:06

## 2020-06-12 RX ADMIN — OXYCODONE HYDROCHLORIDE 5 MG: 5 TABLET ORAL at 11:06

## 2020-06-12 RX ADMIN — ONDANSETRON 4 MG: 2 INJECTION INTRAMUSCULAR; INTRAVENOUS at 07:06

## 2020-06-12 RX ADMIN — GLYCOPYRROLATE 0.2 MG: 0.2 INJECTION, SOLUTION INTRAMUSCULAR; INTRAVENOUS at 07:06

## 2020-06-12 RX ADMIN — Medication 20 MG: at 10:06

## 2020-06-12 RX ADMIN — PREGABALIN 75 MG: 75 CAPSULE ORAL at 06:06

## 2020-06-12 RX ADMIN — DEXMEDETOMIDINE HYDROCHLORIDE 10 MCG: 100 INJECTION, SOLUTION, CONCENTRATE INTRAVENOUS at 09:06

## 2020-06-12 RX ADMIN — CEFAZOLIN 2 G: 330 INJECTION, POWDER, FOR SOLUTION INTRAMUSCULAR; INTRAVENOUS at 07:06

## 2020-06-12 RX ADMIN — ROCURONIUM BROMIDE 50 MG: 10 INJECTION, SOLUTION INTRAVENOUS at 07:06

## 2020-06-12 RX ADMIN — OXYCODONE HYDROCHLORIDE 5 MG: 5 TABLET ORAL at 10:06

## 2020-06-12 RX ADMIN — GLYCOPYRROLATE 0.4 MG: 0.2 INJECTION, SOLUTION INTRAMUSCULAR; INTRAVENOUS at 10:06

## 2020-06-12 RX ADMIN — FAMOTIDINE 20 MG: 10 INJECTION, SOLUTION INTRAVENOUS at 07:06

## 2020-06-12 RX ADMIN — DEXAMETHASONE SODIUM PHOSPHATE 8 MG: 4 INJECTION, SOLUTION INTRAMUSCULAR; INTRAVENOUS at 07:06

## 2020-06-12 RX ADMIN — Medication 30 MG: at 07:06

## 2020-06-12 RX ADMIN — ROCURONIUM BROMIDE 20 MG: 10 INJECTION, SOLUTION INTRAVENOUS at 08:06

## 2020-06-12 RX ADMIN — FENTANYL CITRATE 100 MCG: 50 INJECTION, SOLUTION INTRAMUSCULAR; INTRAVENOUS at 07:06

## 2020-06-12 RX ADMIN — MORPHINE SULFATE 2 MG: 2 INJECTION, SOLUTION INTRAMUSCULAR; INTRAVENOUS at 11:06

## 2020-06-12 NOTE — BRIEF OP NOTE
[facility]  Brief Operative Note    Surgery Date: 6/12/2020     Surgeon(s) and Role:     * Bebe Perez MD - Primary     * Artie Cabezas MD - Fellow    Assisting Surgeon:   Artie Cabezas MD- Fellow  Govind Franco MD- Fellow    Pre-op Diagnosis:  Tear of left acetabular labrum, initial encounter [S73.192A]  Disorder of hip joint [M25.9]  Synovitis [M65.9]    Post-op Diagnosis:  Post-Op Diagnosis Codes:     * Tear of left acetabular labrum, initial encounter [S73.192A]     * Disorder of hip joint [M25.9]     * Synovitis [M65.9]    Procedure(s) (LRB):  FEMOROPLASTY, ARTHROSCOPIC (Left)  ACETABULOPLASTY (Left)  SYNOVECTOMY, HIP (Left)  REPAIR, Capsular Closure, Hip (Left)    Anesthesia: General    Description of the findings of the procedure(s): please see op report    Estimated Blood Loss: * No values recorded between 6/12/2020  7:47 AM and 6/12/2020 10:06 AM *         Specimens:   Specimen (12h ago, onward)    None            Discharge Note    OUTCOME: Patient tolerated treatment/procedure well without complication and is now ready for discharge.    DISPOSITION: Home or Self Care    FINAL DIAGNOSIS:  Labral tear of left hip joint    FOLLOWUP: In clinic    DISCHARGE INSTRUCTIONS:    Discharge Procedure Orders   Diet general     Call MD for:  temperature >100.4     Call MD for:  persistent nausea and vomiting     Call MD for:  severe uncontrolled pain     Call MD for:  difficulty breathing, headache or visual disturbances     Call MD for:  redness, tenderness, or signs of infection (pain, swelling, redness, odor or green/yellow discharge around incision site)     Call MD for:  hives     Call MD for:  persistent dizziness or light-headedness

## 2020-06-12 NOTE — PLAN OF CARE
Discharged to home with family. Pain controlled. Denies nausea. AVS reviewed and copy given. Consents in chartOrdered DME provided to patient. Verbal and written instructions were given to the patient and a competent caregiver on proper usage. Also educated on the risk of falling if DME is not used/not used properly. All parties verbalized understanding.

## 2020-06-12 NOTE — ANESTHESIA POSTPROCEDURE EVALUATION
Anesthesia Post Evaluation    Patient: Joao Jang    Procedure(s) Performed: Procedure(s) (LRB):  FEMOROPLASTY, ARTHROSCOPIC (Left)  ACETABULOPLASTY (Left)  SYNOVECTOMY, HIP (Left)  REPAIR, Capsular Closure, Hip (Left)  REPAIR, LABRAL, HIP    Final Anesthesia Type: general    Patient location during evaluation: PACU  Patient participation: Yes- Able to Participate  Level of consciousness: awake and alert  Post-procedure vital signs: reviewed and stable  Pain management: adequate  Airway patency: patent    PONV status at discharge: No PONV  Anesthetic complications: no      Cardiovascular status: blood pressure returned to baseline  Respiratory status: unassisted  Hydration status: euvolemic  Follow-up not needed.          Vitals Value Taken Time   /81 6/12/2020 12:32 PM   Temp 36.5 °C (97.7 °F) 6/12/2020 10:35 AM   Pulse 84 6/12/2020 12:37 PM   Resp 22 6/12/2020 12:35 PM   SpO2 97 % 6/12/2020 12:37 PM   Vitals shown include unvalidated device data.      Event Time     Out of Recovery 12:45:00          Pain/Leonor Score: Pain Rating Prior to Med Admin: 10 (6/12/2020 11:03 AM)  Pain Rating Post Med Admin: 4 (6/12/2020 12:15 PM)  Leonor Score: 9 (6/12/2020 11:00 AM)

## 2020-06-12 NOTE — ANESTHESIA PREPROCEDURE EVALUATION
06/12/2020  Joao Jang is a 29 y.o., male here for left hip arthroscopy for repair of tear of left acetabular tear.    Patient Active Problem List   Diagnosis    Left hip pain    Labral tear of left hip joint       Past Surgical History:   Procedure Laterality Date    HIP SURGERY      KNEE ARTHROSCOPY          Tobacco Use:  The patient  reports that he has never smoked. He does not have any smokeless tobacco history on file.     Results for orders placed or performed in visit on 12/31/18   EKG 12-lead    Collection Time: 12/31/18 10:08 AM    Narrative    Test Reason : Z00.00,    Vent. Rate : 078 BPM     Atrial Rate : 078 BPM     P-R Int : 158 ms          QRS Dur : 090 ms      QT Int : 368 ms       P-R-T Axes : 072 076 045 degrees     QTc Int : 419 ms    Normal sinus rhythm  Minimal voltage criteria for LVH, may be normal variant  ST elevation, consider early repolarization  Borderline Abnormal ECG  No previous ECGs available  Confirmed by ePter Santos MD ([id number]) on 1/4/2019 3:29:09 PM  Also confirmed by Peter Santos MD ([id number]),  Chrissie Thao ([id number])   on 2/15/2019 10:34:04 AM    Referred By: SELMA PETERSEN           Confirmed By:Peter Santos MD             Lab Results   Component Value Date    WBC 5.0 12/31/2018    HGB 14.4 12/31/2018    HCT 44.7 12/31/2018    MCV 86 12/31/2018     12/31/2018     BMP  Lab Results   Component Value Date     12/31/2018    K 5.0 12/31/2018     12/31/2018    CO2 24 12/31/2018    BUN 13 12/31/2018    CREATININE 1.08 12/31/2018    CALCIUM 10.5 (H) 12/31/2018    EGFRNONAA 92 12/31/2018             Anesthesia Evaluation    I have reviewed the Patient Summary Reports.    I have reviewed the Nursing Notes. I have reviewed the NPO Status.      Review of Systems  Anesthesia Hx:  No problems with previous Anesthesia    Social:  Non-Smoker,  Social Alcohol Use    Hematology/Oncology:  Hematology Normal   Oncology Normal     EENT/Dental:EENT/Dental Normal   Cardiovascular:  Cardiovascular Normal     Pulmonary:  Pulmonary Normal    Renal/:  Renal/ Normal     Hepatic/GI:   GERD, well controlled    Musculoskeletal:  Musculoskeletal Normal    Neurological:  Neurology Normal    Endocrine:  Endocrine Normal    Dermatological:  Skin Normal    Psych:  Psychiatric Normal           Physical Exam  General:  Well nourished    Airway/Jaw/Neck:  Airway Findings: Mouth Opening: Normal Tongue: Normal  General Airway Assessment: Adult  Mallampati: III  Improves to II with phonation.  TM Distance: Normal, at least 6 cm  Jaw/Neck Findings:     Neck ROM: Normal ROM      Dental:  Dental Findings: In tact    Chest/Lungs:  Chest/Lungs Findings: Clear to auscultation, Normal Respiratory Rate     Heart/Vascular:  Heart Findings: Rate: Normal  Rhythm: Regular Rhythm  Sounds: Normal        Mental Status:  Mental Status Findings:  Cooperative, Alert and Oriented         Anesthesia Plan  Type of Anesthesia, risks & benefits discussed:  Anesthesia Type:  general  Patient's Preference:   Intra-op Monitoring Plan: standard ASA monitors  Intra-op Monitoring Plan Comments:   Post Op Pain Control Plan: per primary service following discharge from PACU, IV/PO Opioids PRN and multimodal analgesia  Post Op Pain Control Plan Comments:   Induction:   IV  Beta Blocker:  Patient is not currently on a Beta-Blocker (No further documentation required).       Informed Consent: Patient understands risks and agrees with Anesthesia plan.  Questions answered. Anesthesia consent signed with patient.  ASA Score: 1     Day of Surgery Review of History & Physical: I have interviewed and examined the patient. I have reviewed the patient's H&P dated:            Ready For Surgery From Anesthesia Perspective.

## 2020-06-12 NOTE — INTERVAL H&P NOTE
The patient has been examined and the H&P has been reviewed:    I concur with the findings and no changes have occurred since H&P was written.    Anesthesia/Surgery risks, benefits and alternative options discussed and understood by patient/family.          Active Hospital Problems    Diagnosis  POA    Labral tear of left hip joint [S73.192A]  Yes      Resolved Hospital Problems   No resolved problems to display.

## 2020-06-12 NOTE — PROGRESS NOTES
Discharge instructions given to pt and mother. All questions answered. Both verbalized understanding.

## 2020-06-12 NOTE — PLAN OF CARE
Pre-op complete, all questions answered, pt is stable and ready for next phase of care. Pt states he need crutches . Pt's belongings are in locker # 9.

## 2020-06-12 NOTE — TRANSFER OF CARE
"Anesthesia Transfer of Care Note    Patient: Joao Jang    Procedure(s) Performed: Procedure(s) (LRB):  FEMOROPLASTY, ARTHROSCOPIC (Left)  ACETABULOPLASTY (Left)  SYNOVECTOMY, HIP (Left)  REPAIR, Capsular Closure, Hip (Left)    Patient location: PACU    Anesthesia Type: general    Transport from OR: Transported from OR on room air with adequate spontaneous ventilation. Transported from OR on 6-10 L/min O2 by face mask with adequate spontaneous ventilation    Post pain: adequate analgesia    Post assessment: no apparent anesthetic complications and tolerated procedure well    Post vital signs: stable    Level of consciousness: awake    Nausea/Vomiting: no nausea/vomiting    Complications: none    Transfer of care protocol was followed      Last vitals:   Visit Vitals  BP (!) 149/89 (BP Location: Left arm, Patient Position: Lying)   Pulse 92   Temp 36.7 °C (98.1 °F) (Oral)   Resp 18   Ht 6' 1" (1.854 m)   Wt 88.5 kg (195 lb)   SpO2 100%   BMI 25.73 kg/m²     "

## 2020-06-12 NOTE — DISCHARGE INSTRUCTIONS
Recovery After Procedural Sedation (Adult)  You have been given medicine by vein to make you sleep during your surgery. This may have included both a pain medicine and sleeping medicine. Most of the effects have worn off. But you may still have some drowsiness for the next 6 to 8 hours.    Home care  Follow these guidelines when you get home:  · For the next 8 hours, you should be watched by a responsible adult. This person should make sure your condition is not getting worse.  · Don't drink any alcohol for the next 24 hours.  · Don't drive, operate dangerous machinery, or make important business or personal decisions during the next 24 hours.  Note: Your healthcare provider may tell you not to take any medicine by mouth for pain or sleep in the next 4 hours. These medicines may react with the medicines you were given in the hospital. This could cause a much stronger response than usual.    Follow-up care  Follow up with your healthcare provider if you are not alert and back to your usual level of activity within 12 hours.    When to seek medical advice  Call your healthcare provider right away if any of these occur:  · Drowsiness gets worse  · Weakness or dizziness gets worse  · Repeated vomiting  · You can't be awakened     Date Last Reviewed: 10/18/2016  © 7526-6814 [employer]. [address] [place] [address], [place] [address]. All rights reserved. This information is not intended as a substitute for professional medical care. Always follow your healthcare professional's instructions.    Discharge Instructions: After Your Surgery  You've just had surgery. During surgery, you were given medicine called anesthesia to keep you relaxed and free of pain. After surgery, you may have some pain or nausea. This is common. Here are some tips for feeling better and getting well after surgery.    Stay on schedule with your medicine.   Going home  Your healthcare provider will show you how to take care of yourself when  you go home. He or she will also answer your questions. Have an adult family member or friend drive you home. For the first 24 hours after your surgery:  · Have someone stay with you, if needed. He or she can watch for problems and help keep you safe.  Be sure to go to all follow-up visits with your healthcare provider. And rest after your surgery for as long as your healthcare provider tells you to.    Coping with pain  If you have pain after surgery, pain medicine will help you feel better. Take it as told, before pain becomes severe. Also, ask your healthcare provider or pharmacist about other ways to control pain. This might be with heat, ice, or relaxation. And follow any other instructions your surgeon or nurse gives you.    Tips for taking pain medicine  To get the best relief possible, remember these points:  · Pain medicines can upset your stomach. Taking them with a little food may help.  · Most pain relievers taken by mouth need at least 20 to 30 minutes to start to work.  · Taking medicine on a schedule can help you remember to take it. Try to time your medicine so that you can take it before starting an activity. This might be before you get dressed, go for a walk, or sit down for dinner.  · Constipation is a common side effect of pain medicines. Call your healthcare provider before taking any medicines such as laxatives or stool softeners to help ease constipation. Also ask if you should skip any foods. Drinking lots of fluids and eating foods such as fruits and vegetables that are high in fiber can also help. Remember, do not take laxatives unless your surgeon has prescribed them.    Your healthcare provider may tell you to take acetaminophen to help ease your pain. Ask him or her how much you are supposed to take each day. Acetaminophen or other pain relievers may interact with your prescription medicines or other over-the-counter (OTC) medicines. Some prescription medicines have acetaminophen and  other ingredients. Using both prescription and OTC acetaminophen for pain can cause you to overdose. Read the labels on your OTC medicines with care. This will help you to clearly know the list of ingredients, how much to take, and any warnings. It may also help you not take too much acetaminophen. If you have questions or do not understand the information, ask your pharmacist or healthcare provider to explain it to you before you take the OTC medicine.    Managing nausea  Some people have an upset stomach after surgery. This is often because of anesthesia, pain, or pain medicine, or the stress of surgery. These tips will help you handle nausea and eat healthy foods as you get better. If you were on a special food plan before surgery, ask your healthcare provider if you should follow it while you get better. These tips may help:  · Do not push yourself to eat. Your body will tell you when to eat and how much.  · Start off with clear liquids and soup. They are easier to digest.  · Next try semi-solid foods, such as mashed potatoes, applesauce, and gelatin, as you feel ready.  · Slowly move to solid foods. Don't eat fatty, rich, or spicy foods at first.  · Do not force yourself to have 3 large meals a day. Instead eat smaller amounts more often.  · Take pain medicines with a small amount of solid food, such as crackers or toast, to avoid nausea.     Call your surgeon if  · You still have pain an hour after taking medicine. The medicine may not be strong enough.  · You feel too sleepy, dizzy, or groggy. The medicine may be too strong.  · You have side effects like nausea, vomiting, or skin changes, such as rash, itching, or hives.       If you have obstructive sleep apnea  You were given anesthesia medicine during surgery to keep you comfortable and free of pain. After surgery, you may have more apnea spells because of this medicine and other medicines you were given. The spells may last longer than usual.   At  home:  · Keep using the continuous positive airway pressure (CPAP) device when you sleep. Unless your healthcare provider tells you not to, use it when you sleep, day or night. CPAP is a common device used to treat obstructive sleep apnea.  · Talk with your provider before taking any pain medicine, muscle relaxants, or sedatives. Your provider will tell you about the possible dangers of taking these medicines.  Date Last Reviewed: 12/1/2016  © 0992-9725 [employer]. [address] [place] [address]. All rights reserved. This information is not intended as a substitute for professional medical care. Always follow your healthcare professional's instructions.          PATIENT INSTRUCTIONS  POST-ANESTHESIA    IMMEDIATELY FOLLOWING SURGERY:  Do not drive or operate machinery for the first twenty four hours after surgery.  Do not make any important decisions for twenty four hours after surgery or while taking narcotic pain medications or sedatives.  If you develop intractable nausea and vomiting or a severe headache please notify your doctor immediately.    FOLLOW-UP:  Please make an appointment with your surgeon as instructed. You do not need to follow up with anesthesia unless specifically instructed to do so.    WOUND CARE INSTRUCTIONS (if applicable):  Keep a dry clean dressing on the anesthesia/puncture wound site if there is drainage.  Once the wound has quit draining you may leave it open to air.  Generally you should leave the bandage intact for twenty four hours unless there is drainage.  If the epidural site drains for more than 36-48 hours please call the anesthesia department.    QUESTIONS?:  Please feel free to call your physician or the hospital  if you have any questions, and they will be happy to assist you.       [facility] Anesthesia Department  [address] [facility]  [phone]      Wound Check After Surgery: Bleeding  Surgery involves cutting through  layers of skin, fatty tissue, muscle, and sometimes bone and cartilage. Sutures (stitches) or staples are used to close all layers of the wound. The sutures on the inside will dissolve in about 2 to 3 weeks. Any sutures or staples used on the outside need to be removed in about 7 to 14 days, depending on the location.  It is normal to have some clear or bloody discharge on the wound covering or bandage (dressing) for the first few days after surgery. If your wound was sutured (sewn) closed, you should not have to change the dressing more than three times a day in the first few days. Bleeding or discharge requiring more frequent dressing changes can be a sign of a problem.    Home care  Different types of surgery require different types of care and dressing changes. It is important to follow all instructions and advice from your surgeon, as well as other members of your healthcare team.    Wound care  · Keep the wound clean, as directed by your healthcare provider.  · Change the dressing as directed. Change the dressing sooner if it becomes wet or stained with blood or fluid from the wound.  · Bathe with a sponge (no shower or tub baths) for the first few days after surgery, or until there is no more drainage from the wound. Unless you received different instructions from your surgeon, you can then shower. Do not soak the area in water (no baths or swimming) until the tape, sutures, or staples are removed and any wound opening has dried out and healed.    Changing the dressing  · Wash your hands before changing the dressings.  · Carefully remove the dressing and tape; don't just yank it off. If it sticks to the wound, you may need to wet it a little to remove it, unless your healthcare provider told you not to wet it.  · Wash your hands again before putting on a new, clean dressing.  · Gently clean the wound with clean water (or saline) using gauze or a clean washcloth. Do not rub it or pick at it.  · Do not use  soap, alcohol, hydrogen peroxide, or any other cleanser.  · If you were told to dry the wound before putting on a new dressing, gently pat it dry. Do not rub.  · Throw out the old dressing. Do not reuse it!  · Wash your hands again when you are done.    Types of dressings  Your healthcare team will tell you what type of dressing to put on your wound. Follow your healthcare team's instructions carefully, and contact them if you have any questions. Two common types of dressings are described below. You may have one of these or another type.  · Dry dressing. Use dry gauze. If the wound is still draining, use a nonadherent dressing, which shouldn't stick to the wound.  · Wet-to-dry dressing. Wet the gauze, and squeeze out the excess water (or saline), before putting it on. Then, cover this with a dry pad.    Medicines  · If you were given antibiotics, take them until they are used up or your healthcare provider tells you to stop. It is important to finish the antibiotics even though you feel better, to make sure the infection has cleared.  · You can take acetaminophen or ibuprofen for pain, unless you were given a different pain medicine to use. (Note: If you have chronic liver or kidney disease, or have ever had a stomach ulcer or gastrointestinal bleeding, or are taking blood thinner medicines, talk with your healthcare provider before using these medicines.)  · Aspirin should never be used in anyone under 18 years of age who is ill with a fever. It may cause severe liver damage.    Follow-up care  Follow up with your healthcare provider, or as advised, for your next wound check or removal of sutures, staples, or tape.  · If a culture was done, you will be notified if the results will affect your treatment. You can call as directed for the results.  · If imaging tests, such as X-rays, an ultrasound, or CT scan were done, they will be reviewed by a specialist. You will be notified of the results, especially if they  affect treatment.    Call 911  Call emergency services right away if any of these occur:  · Trouble breathing or swallowing, wheezing  · Hoarse voice or trouble speaking  · Extreme confusion  · Extreme drowsiness or trouble awakening  · Fainting or loss of consciousness  · Rapid heart rate or very slow heart rate  · Vomiting blood, or large amounts of blood in stool  · Discomfort in the center of the chest that feels like pressure, squeezing, a sense of fullness, or pain  · Discomfort or pain in other upper body areas, such as the back, one or both arms, neck, jaw, or stomach  · Stroke symptoms (spot a stroke FAST)  ¨ F: Face drooping. One side of the face is numb or droops.  ¨ A: Arm weakness. One arm feels weak or numb.  ¨ S: Speech difficulty: Speech is slurred, or the person is unable to speak.  ¨ T: Time to call 911. Even if symptoms go away, call 911.    When to seek medical advice  Call your healthcare provider right away if any of the following occur:  · Fluid or blood soaking 5 or more bandages a day during the first 3 days after surgery  · Fluid or blood still draining from the wound more than 3 days after surgery  · Increasing pain at the site of surgery  · Fever over 100.4º F (38.0º C)  · Redness around the wound  · Pus coming from the wound  · Vomiting, constipation, or diarrhea    Date Last Reviewed: 9/27/2015  © 3834-1171 [employer]. [address] [place] [address]. All rights reserved. This information is not intended as a substitute for professional medical care. Always follow your healthcare professional's instructions.      Wound Check After Surgery, No Complication    It is normal to feel pain at the incision site. The pain decreases as the wound heals. Most of the pain and soreness from the skin incision should go away by the time the sutures or staples are removed. Soreness and pain from deeper tissues may last another week or two.  Pain that continues more than a few  weeks after surgery or pain that worsens anytime after surgery can be a sign of a problem, such as:  · Infection  · Separation of wound edges  · Collection of blood or other below the skin        Date Last Reviewed: 9/27/2015  © 4034-2559 [employer]. [address] [place] [address]. All rights reserved. This information is not intended as a substitute for professional medical care. Always follow your healthcare professional's instructions.

## 2020-06-12 NOTE — BRIEF OP NOTE
[facility]  Brief Operative Note    Surgery Date: 6/12/2020     Surgeon(s) and Role:     * Bebe Perez MD - Primary     * Artie Cabezas MD - Fellow    Assisting Surgeon: None    Pre-op Diagnosis:  Tear of left acetabular labrum, initial encounter [S73.192A]  Disorder of hip joint [M25.9]  Synovitis [M65.9]    Post-op Diagnosis:  Post-Op Diagnosis Codes:     * Tear of left acetabular labrum, initial encounter [S73.192A]     * Disorder of hip joint [M25.9]     * Synovitis [M65.9]    Procedure(s) (LRB):  FEMOROPLASTY, ARTHROSCOPIC (Left)  ACETABULOPLASTY (Left)  SYNOVECTOMY, HIP (Left)  REPAIR, Capsular Closure, Hip (Left)    Anesthesia: General    Description of the findings of the procedure(s): see above    Estimated Blood Loss: * No values recorded between 6/12/2020  7:47 AM and 6/12/2020 10:05 AM *         Specimens:   Specimen (12h ago, onward)    None            Discharge Note    OUTCOME: Patient tolerated treatment/procedure well without complication and is now ready for discharge.    DISPOSITION: Home or Self Care    FINAL DIAGNOSIS:  Labral tear of left hip joint    FOLLOWUP: In clinic    DISCHARGE INSTRUCTIONS:    Discharge Procedure Orders   Diet general     Call MD for:  temperature >100.4     Call MD for:  persistent nausea and vomiting     Call MD for:  severe uncontrolled pain     Call MD for:  difficulty breathing, headache or visual disturbances     Call MD for:  redness, tenderness, or signs of infection (pain, swelling, redness, odor or green/yellow discharge around incision site)     Call MD for:  hives     Call MD for:  persistent dizziness or light-headedness

## 2020-06-12 NOTE — OP NOTE
DATE OF PROCEDURE: 6/12/2020    SURGEON:  Bebe Perez M.D.    ASSISTANT: YENNIFER Cabezas MD PGY6, CECIL Clarke PGY6  ASSISTANT: SMA J Carlos      PREOPERATIVE DIAGNOSIS:    Left:  1. Hip labral tear  2. Hip chondrolabral dysfunction  3. Hip synovitis  4. Hip capsular laxity  5. Hip chondromalacia  6. Hip loose bodies     POSTOPERATIVE DIAGNOSIS:    Left:  1. Hip labral tear  2. Hip chondrolabral dysfunction  3. Hip synovitis  4. Hip capsular laxity  5. Hip chondromalacia  6. Hip loose bodies     PROCEDURE:    Left:  1. Hip arthroscopic labral repair, knotless (CPT 62227)  2. Hip arthroscopic femoral neck osteoplasty (CPT 30179)  3. Hip arthroscopic partial synovectomy/debridement (CPT 85212)  4. Hip arthroscopic acetabuloplasty (CPT 65771)  5. Hip arthroscopic chondroplasty (CPT 36613)  6. Hip arthroscopic capsular closure (CPT 46204)  7. Hip fluoroscopic guidance for needle placement/localization (CPT 23438)  8. Hip arthoscopic loose body removal     ANESTHESIA:  General with local 0.5% ropivicaine 30ml (5mg/ml), 60 mg ketamine, 60mg toradol (2ml toradol (30mg/ml))    BLOOD LOSS:  Minimal.    DRAINS:  None.    COMPLICATIONS:  None.    TOURNIQUET TIME:  None.    DISPOSITION:  The patient was moved to the recovery room in stable condition, with extremity and abdominal compartments soft and capillary refill less than a second in all digits.    Complex:  There was significant scarring to the area of the supra-labral recess and there were significant adhesions to the labrum anteriorly, and throughout the capsule anteriorly.  This was debrided using thermal device, restoring the area of the supra-labral recess and scar was also removed from the labrum anteriorly.  There was an altered surgical field.  There was abnormal anatomy. There was major scarring to the area.  Complexity of the service was much greater than the normative procedure.     BRIEF INDICATION OF MEDICAL NECESSITY:  The patient is a 29 y.o. year-old male  who  was seen in the office with a history with a history and physical examination findings consistent with the above.  Details of non-operative versus operative options were discussed.  The risks and benefits were discussed with the patient.  The patient acknowledged understanding and wished to proceed with an operative intervention.  Informed consent was obtained prior to the procedure.  Details of the procedure were explained including probable incisions and probable rehabilitation course.  The patient understands the likely length of convalescence after surgery; and the risks, benefits and alternatives of the surgery were explained.  Reasonable expectations and potential complications were discussed and acknowledged including, but not limited to: infection, bleeding, blood clots (DVT and/or PE), nerve injury (including but not limited to: LFCN, sciatic, peroneal, pudendal), heterotopic ossification, re-tear, instability, fracture, continued pain, loss of function, stiffness, arthritis, need for further surgery, skin breakdown, pudendal nerve palsy.  It was explained there was a chance of failure which may require further management.  The patient agreed and understood and wished to proceed.    PROCEDURE IN DETAIL:  The operative site was marked by the operative surgeon. The patient was brought into the operating room.  The patient was then carefully moved to the hip table.  General anesthesia was administered by the anesthesia team.  All pressure points were carefully padded and checked.  Both feet were then placed in well-padded foot holders in comfortable positions.  The upper extremities were placed in comfortable positions and carefully checked.  Balanced suspension was placed on the operative lower extremity with mild balanced suspension to the nonoperative lower extremity.  The upper extremities were then again checked thoroughly.  The C-arm image was brought into the operating room and the procedure was done  under the guidance of the C-arm.  When adequate joint distraction was achieved the operative hip was prepped and draped in the usual sterile fashion.  5cc skin and sub-cutaneous tissue was infiltrated with local anesthetic mixture at each portal site; mid-lateral followed by infero-medial portals were created in the standard fashion, making only a superficial skin incision and subsequent stepwise blunt dilation with Seldinger technique, and a systematic examination of the joint revealed the following:    A standard anterolateral portal was created under fluoroscopic visualization.  A standard mid-anterior portal was then created.  Capsulotomies were performed with a Ouzinkie blade.  Confirmation of non-translabral entry into the hip joint was confirmed with both portals.     There was synovitis noted about the hip joint.  This was debrided using thermal device and shaver.      There was evidence of chondral lesions to the: acetabulum at  6:00 position, 15 x 15mm, grade 3, chondroplasty was performed at site of chondromalacia with shaver and thermal device.  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    Loose bodies were identified in the central compartment measuring [id number] at least x 3.  Loose bodies were removed with arthroscopic grasper and shaver.    The labrum was probed carefully and a complex anterior-superior labral tear was encountered.      The labral repair proceeded.  Labral repair tissue was carefully prepared.  The area of the pincer deformity was identified and the detachment of the labrum proceeded with a Ouzinkie blade.  Once this was achieved and the capsule was debrided the acetabular rim trimming proceeded and approximately 3 mm was resected.  Complex tear of labral tissue was carefully prepared.  The labrum was not detached, acetabular bone  was then prepared (1mm), and burred to bleeding surface. Next, a 2.9 mm Arthrex Bio-Composite Push Lock anchor was placed in the acetabulum.  Confirmation of non-engagement of the chondral surface was ensured.  This was repeated for 2 more anchors for a total of 3 anchors.  The anchors had a good purchase and suture was placed using the Slingshot instrument securing the labrum nicely.  Suture was placed using lasso-loop technique.  This gave 3 sutures holding the labrum and the labral repair was good and the labrum was secured and was probed and found to be quite stable    The peripheral compartment was then inspected.  Traction was released and the hip was flexed to 45 degrees and the camera was passed along the femoral head-neck junction into the peripheral space.  The femoral head and neck junction was visualized.  The peripheral compartment instrumentation was placed through the mid-anterior portal.  The anterior femoral head and neck junction were visualized and the area of the CAM lesion was identified.  Dynamic examination of the hip motion, femoro-acetabular and labral articulation was performed showing the CAM lesion.  A 5 mm round bur was used for the femoral neck offset, transforming the surface of the CAM lesion into a concave surface.  The resection was approximately 5 mm deep and approximately 55 mm wide.  Resection was performed along the anterior head from the medial 6 o'clock position to the lateral 12 o'clock position.  Periodic dynamic examination was performed.  Hip was stable to dynamic examination intra-operatively.    Complex:  There was significant scarring to the area of the supra-labral recess and there were significant adhesions to the labrum anteriorly, and throughout the capsule anteriorly.  This was debrided using thermal device, restoring the area of the supra-labral recess and scar was also removed from the labrum anteriorly.  There was an altered surgical field.  There was abnormal  anatomy. There was major scarring to the area.  Complexity of the service was much greater than the normative procedure.    There was no evidence of any loose bodies in the peripheral compartment.     Hip capsule interportal capsulotomy was closed using a #2 Fiberwire suture x 3 tied in a simple fashion.    The hip was then copiously irrigated and the fluid was extravasated using suction.  Local anesthetic 10 cc was instilled around each portal.  The portal sites were closed using 3-0 nylon suture in an interrupted fashion.  The incisions were dressed with Xeroform, 4 x 4's, abd pads and MediPore tape.  TENS unit pads were placed which were medically necessary for pain relief.  An iceman and hip brace were secured.  The patient was then extubated, moved to the recovery room where the patient arrived in stable condition with extremity and abdominal compartments soft and capillary refill less than a second in all digits.      POSTOPERATIVE PLAN: We will follow the hip arthroscopy with osteoplasty, capsular closure, and labral repair guidelines.  The patient will be partial weightbearing for 3 weeks.      Re: 30476 Unlisted procedure - Capsular closure/plication   This letter is intended to support our request for additional reimbursement on the unlisted 70901 procedural code covering hip arthroscopic capsular repair and/or plication.  This request is in addition to the treatment of femoroacetabular impingement or labral tear which are generally billed under 58938, 00635, and 59333.     Hip arthroscopy and preservation is a rapidly evolving field with a growing body of clinical and biomechanical evidence to support routine closure of the hip capsule at the conclusion of the procedure.1 A recent publication showed improved outcomes for patients who received complete rather than partial repair of a T-capsulotomy.  They compared a consecutive series of 32 patients with partial repair versus 32 patients with complete  repair performed by the same surgeon and found improved Hip Outcome Score-Sports Specific subscale (HOS-SS) at the 6 month, 1 year, and 2 year time points.  Additionally the patients in the partial repair group had a higher revision rate at 13% compared to 0% in the complete repair group.2  A recently published study by Clive and colleagues further supports the importance of hip capsular stability to the long term performance of the hip. They looked at the 5 year outcomes of 130 patients with either capsular release or capsular closure at the time of hip arthroscopy.  The patients who underwent capsular release had a significant deterioration in their hip scores and patient reported satisfaction between 2 and 5 years after surgery while the capsular repair group maintained their improvements over time.3   Repair/plication becomes even more critical in preventing iatrogenic hip instability in the borderline dysplasia patient and has shown to be of significant benefit when performed in combination with labral preservation in this patient subset.4   I believe that capsular closure is indicated in the majority of hip arthroscopy patients.  My personal experience confirms the growing body of evidence that these patients improve faster and have a higher ceiling in their outcome measures than in patients who are not repaired.  Like most procedures in Orthopedics, the closer we can restore the patient to their native anatomy, the better they do.   Complete closure of the hip capsular or plication involves the passage of both ends of between 3 and 8 sutures through capsule and then tying these in sequence.  This is not dissimilar to arthroscopic capsulorrhaphy in the shoulder in both technique and time.   We therefore propose similar reimbursement to 72269 Shoulder capsulorrhaphy.    Sincerely,      Bebewilman Perez M.D.  [facility]      1. NA Herrera, LINDA Niño, ROSANNE Velasquez, RENEE Soto, HEIDI Rodriguez III, ROSETTA Conley, SJ  Nho.  Capsular Management in Hip Arthroscopy: An Anatomic, Biomechanical, and Technical Review.  Front Surg. 2016; 3:13.    2. Enoch RM, Jed S, Paulette CA, Julisa BT, Jarvis MJ, Nho SJ..  Improved outcomes after hip arthroscopic surgery in patients undergoing T-capsulotomy with complete repair versus partial repair for femoroacetabular impingement: a comparative matched-pair analysis.  Am J Sports Med 2014; 42(11):8798-4848.    3. Alisha L, Vic MONZON.  Patient Reported Outcomes of Capsular Repair versus Capsulotomy in Patients Undergoing Hip Arthroscopy: Minimum 5-year Follow-up.  A Matched Cohort Study.  J of Arthroscopy 2017; 33(6): e13-e14.    4. Clive BG, Josué CE, Abhijeet D, El-Musa Y, Casey TJ.   Arthroscopic capsular plication and labral preservation in borderline hip dysplasia: Two-year clinical outcomes of a surgical approach to a challenging problem.  Am J Sports Med 2013; 41(11):2591-8.

## 2020-06-15 ENCOUNTER — TELEPHONE (OUTPATIENT)
Dept: SPORTS MEDICINE | Facility: CLINIC | Age: 30
End: 2020-06-15

## 2020-06-15 ENCOUNTER — CLINICAL SUPPORT (OUTPATIENT)
Dept: REHABILITATION | Facility: HOSPITAL | Age: 30
End: 2020-06-15
Payer: COMMERCIAL

## 2020-06-15 VITALS
BODY MASS INDEX: 25.84 KG/M2 | HEART RATE: 82 BPM | OXYGEN SATURATION: 99 % | DIASTOLIC BLOOD PRESSURE: 81 MMHG | HEIGHT: 73 IN | SYSTOLIC BLOOD PRESSURE: 158 MMHG | RESPIRATION RATE: 17 BRPM | TEMPERATURE: 98 F | WEIGHT: 195 LBS

## 2020-06-15 DIAGNOSIS — M25.652 STIFFNESS OF HIP JOINT, LEFT: ICD-10-CM

## 2020-06-15 DIAGNOSIS — M25.652 DECREASED RANGE OF LEFT HIP MOVEMENT: Primary | ICD-10-CM

## 2020-06-15 DIAGNOSIS — S73.192A TEAR OF LEFT ACETABULAR LABRUM, INITIAL ENCOUNTER: ICD-10-CM

## 2020-06-15 DIAGNOSIS — R26.9 GAIT ABNORMALITY: ICD-10-CM

## 2020-06-15 DIAGNOSIS — S73.192A LABRAL TEAR OF LEFT HIP JOINT: ICD-10-CM

## 2020-06-15 DIAGNOSIS — Z78.9 DIFFICULTY NAVIGATING STAIRS: ICD-10-CM

## 2020-06-15 DIAGNOSIS — G89.18 POST-OPERATIVE PAIN: ICD-10-CM

## 2020-06-15 DIAGNOSIS — R29.898 WEAKNESS OF LEFT HIP: ICD-10-CM

## 2020-06-15 DIAGNOSIS — M25.9 DISORDER OF HIP JOINT: ICD-10-CM

## 2020-06-15 PROCEDURE — 97161 PT EVAL LOW COMPLEX 20 MIN: CPT

## 2020-06-15 PROCEDURE — 97110 THERAPEUTIC EXERCISES: CPT

## 2020-06-15 RX ORDER — KETOROLAC TROMETHAMINE 10 MG/1
10 TABLET, FILM COATED ORAL EVERY 6 HOURS PRN
Qty: 12 TABLET | Refills: 0 | Status: SHIPPED | OUTPATIENT
Start: 2020-06-15

## 2020-06-15 RX ORDER — OXYCODONE AND ACETAMINOPHEN 10; 325 MG/1; MG/1
TABLET ORAL
Qty: 21 TABLET | Refills: 0 | Status: SHIPPED | OUTPATIENT
Start: 2020-06-15 | End: 2020-06-25 | Stop reason: ALTCHOICE

## 2020-06-15 RX ORDER — TRAMADOL HYDROCHLORIDE 50 MG/1
50-100 TABLET ORAL EVERY 6 HOURS PRN
Qty: 21 TABLET | Refills: 0 | Status: SHIPPED | OUTPATIENT
Start: 2020-06-15 | End: 2020-06-29 | Stop reason: SDUPTHER

## 2020-06-15 NOTE — PLAN OF CARE
"[facility]  Physical Therapy Initial Evaluation    Name: Joao Jang  [facility] Number: [id number]    Therapy Diagnosis:   Encounter Diagnoses   Name Primary?    Tear of left acetabular labrum, initial encounter     Decreased range of left hip movement Yes    Weakness of left hip     Stiffness of hip joint, left     Difficulty navigating stairs     Gait abnormality      Physician: John Lantigua III, *    Physician Orders: PT Eval and Treat  Medical Diagnosis from Referral: S73.192A (ICD-10-CM) - Tear of left acetabular labrum, initial encounter  Evaluation Date: 6/15/2020  Authorization Period Expiration: pending  Plan of Care Expiration: 12/25/2020  Visit # / Visits authorized: 1/pending    Time In: 0910  Time Out: 1010  Total Billable Time: 60 minutes    Precautions: Standard and Weightbearing   POSTOPERATIVE PLAN: We will follow the hip arthroscopy with osteoplasty, capsular closure, and labral repair guidelines.  The patient will be partial weightbearing for 3 weeks.    Subjective     Date of onset: 2010  History of current condition - Joao reports: that the initial injury to his L hip occurred in 2010 during a football game where he dislocated his hip. Tried to rehab conservatively but symptoms persisted. Had a labral debridement performed by an outside provider in 2015 with reported worsening of symptoms. Performed no formal physical therapy afterwards. Pain has gradually been worsening over past several years with worst symptoms in past 6 months. Has managed symptoms primarily with pain medication without any exercise therapy. States that he's had a fair amount of pain since surgery and woke up one night after feeling like his hip "popped." Support from wife at home to assist with childcare.     Imaging 1. Fraying of the anterior to superior acetabular labrum.  2. Progression of left hip cartilage loss with subchondral cystic change and marrow edema.    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 with limited lower body training per outside provider's advice  Social History: [place]  Occupation: Pt is an ; very active with walking and stairs  Prior Level of Function: indep with ADLs, self care, work, and exercise  Current Level of Function: unable to work or exercise, limited ADLs and self care    Pain:  Current 7/10, worst 9/10, best 5/10   Location: left hip  Description: Aching, Dull and Throbbing  Aggravating Factors: standing, walking, prolonged positioning  Easing Factors: pain medication, ice and rest    Pts goals: to return to unrestricted work and modified recreational exercise    Medical History:   No past medical history on file.    Surgical History:   Joao Jang  has a past surgical history that includes Hip surgery; Knee arthroscopy; Arthroscopic femoroplasty (Left, 6/12/2020); Acetabuloplasty (Left, 6/12/2020); Synovectomy of hip (Left, 6/12/2020); and Repair of labrum of hip (6/12/2020).    Medications:   Joao has a current medication list which includes the following prescription(s): hydrocodone-acetaminophen, indomethacin, lisdexamfetamine, naproxen, omeprazole, oxycodone-acetaminophen, pantoprazole, promethazine, tizanidine, and tramadol.    Allergies:   Review of patient's allergies indicates:   Allergen Reactions    Valium [diazepam]      Pt states "makes me crazy"        Objective     Observation: presents to clinic with L hip immobilizer brace in place; new bandages covering arthroscopic portals in good condition    Gait: ambulates TTWB w/ bilat axillary crutches    Palpation: mild TTP over portals    L hip strength: not formally assessed d/t post-op status    Hip Range of Motion:   Left   Flexion 60   Extension 0   Abduction NT   ER NT   IR 10     Special tests: not indicated    Sensation: intact and " "symmetrical bilaterally; normal dermatomal assessment    Myotomes: no deficits noted    Perfusion: normal pulse at dorsalis pedis/tibial tunnel    CMS Impairment/Limitation/Restriction for FOTO Hip Survey    Therapist reviewed FOTO scores for Joao Jang on 6/15/2020.   FOTO documents entered into [employer] - see Media section.    Limitation Score: -% (next time)  Category: Mobility       Treatment     Treatment Time In: 0945  Treatment Time Out: 1000  Total Treatment time separate from Evaluation: 15 minutes    Joao received therapeutic exercises to develop strength, endurance, ROM, flexibility and core stabilization for 15 minutes including:  - Hip PROM per protocol  - Ankle pump x20  - Quad/glute/TA set 10x5" ea    Home Exercises and Patient Education Provided     Education provided:   - Precautions/contraindications  - Symptom modification and positional changes  - Tissue healing timeline  - Prognosis, activity modification, goals for therapy, role of therapy for care, exercises/HEP    Written Home Exercises Provided: verbal.  Exercises were reviewed and Joao was able to demonstrate them prior to the end of the session.  Joao demonstrated good  understanding of the education provided.     Assessment     Joao is a 29 y.o. male referred to outpatient Physical Therapy s/p L hip arthroscopy with labral repair. Pt presents w/ notable deficits in L hip ROM, reduced hip muscle activation, decreased joint mobility altered gait mechanics, TTP, and increased pain symptoms that necessitate skilled physical therapy. Additionally, this patient demonstrates an extensive history of issues with the involved hip and lower extremity, including L knee and L hip surgeries throughout the past decade. The chronicity of this condition increases the complexity of this case and will require a fair amount of patient education in order to manage symptoms and expectations for his outcome following the current surgery.    Pt " will benefit from skilled outpatient Physical Therapy to address the deficits stated above and in the chart below, provide pt/family education, and to maximize pt's level of independence. Pt prognosis is Good.     Plan of care discussed with patient: Yes  Pt's spiritual, cultural and educational needs considered and patient is agreeable to the plan of care and goals as stated below:       Anticipated Barriers for therapy: Covid-19; pain intensity    Medical Necessity is demonstrated by the following  History  Co-morbidities and personal factors that may impact the plan of care Co-morbidities:   none    Personal Factors:   lifestyle     low   Examination  Body Structures and Functions, activity limitations and participation restrictions that may impact the plan of care Body Regions:   lower extremities    Body Systems:    gross symmetry  ROM  strength  balance  gait  transfers  motor control  edema    Participation Restrictions:   Driving, walking, running, jumping, working    Activity limitations:   Learning and applying knowledge  No deficit    General Tasks and Commands  No deficit    Communication  No deficit    Mobility  lifting and carrying objects  walking  driving (bike, car, motorcycle)    Self care  No deficit    Domestic Life  No deficit    Interactions/Relationships  No deficit    Life Areas  Self care, ADLs    Community and Social Life  No deficit          low   Clinical Presentation stable and uncomplicated low   Decision Making/ Complexity Score: low     Goals:  Short Term Goals: 2-4 weeks  1. Pt will be compliant with HEP 50% of prescribed amount.   2. The pt to demo improvement in L hip flexion PROM to at least 90 degrees for improved functional mobility.  3.  The pt will report L hip pain of <6/10 at worst to improve tolerance to therapy and functional mobility.    Long Term Goals: 20 weeks   1. Pt will be compliant with % of prescribed amount.   2. The pt will demonstrate normal gait  mechanics sans AD over level surfaces for all community distances to restore functional mobility.  3. The patient will demonstrate hip MMT scores of at least 4+/5 grossly to improve strength for weight bearing activities.  4. The patient will report pain <2/10 at worst with all exercise to improve tolerance to therapy and recreation.  5. The pt will report full participation in ADLs and IADLs without restrictions related to L hip.    Plan   Plan of care Certification: 6/15/2020 to 12/25/2020    Outpatient Physical Therapy 2 times weekly for 16 weeks to include the following interventions: Aquatic Therapy, Electrical Stimulation NMES/TENS, Gait Training, Manual Therapy, Moist Heat/ Ice, Neuromuscular Re-ed, Patient Education, Self Care, Therapeutic Activites and Therapeutic Exercise.     Marga Mijares, PT , DPT, SCS, FAAMOMPT    Co-treated by John Osborn, PT, DPT

## 2020-06-15 NOTE — TELEPHONE ENCOUNTER
Spoke with patient letting him know Derrick will send Toradol to preferred Pharmacy, that he can take this medication along with tramadol and Percocet. Advised Patient to stop taking Naproxen and Indocin, Patient stated he has taken the last Indocin today. And will no longer take naproxen. He has a Tens unit that he has been using as well as icing area. Patient is requesting a refill on Tramadol and Percocet, he would like him medication to be sent to [facility] located at [address] [place] [address]

## 2020-06-15 NOTE — TELEPHONE ENCOUNTER
Patient having uncontrolled post-op pain. Will refill percocet and tramadol. Will add toradol for him to take. Instructions given to him by Linda. He has completed the indocin and will not take naproxen until after he is finished the toradol.

## 2020-06-16 ENCOUNTER — TELEPHONE (OUTPATIENT)
Dept: SPORTS MEDICINE | Facility: CLINIC | Age: 30
End: 2020-06-16

## 2020-06-16 NOTE — TELEPHONE ENCOUNTER
Spoke to patient and informed him we have not received his paperwork yet. Patient said he will re-fax it soon.

## 2020-06-16 NOTE — TELEPHONE ENCOUNTER
----- Message from Abby Buckley sent at 6/16/2020  3:43 PM CDT -----  Regarding: PT  Contact: PT  Is faxing over some paperwork for his short time disability. Needs to be filled out and send to the number on the paperwork     Callback: [phone]

## 2020-06-17 ENCOUNTER — TELEPHONE (OUTPATIENT)
Dept: SPORTS MEDICINE | Facility: CLINIC | Age: 30
End: 2020-06-17

## 2020-06-17 LAB
FINAL PATHOLOGIC DIAGNOSIS: NORMAL
GROSS: NORMAL

## 2020-06-17 NOTE — TELEPHONE ENCOUNTER
----- Message from Kristyn Hurtado sent at 6/17/2020 12:07 PM CDT -----  Pt would like to receive a call back regarding a fax that was sent over for disability. Please contact the pt to advise.    Contact info [phone]

## 2020-06-17 NOTE — TELEPHONE ENCOUNTER
Spoke to patient and informed him we do have his paperwork and will get it faxed before the end of the day. Patient also wanted it faxed to Pam Betancur at [phone].

## 2020-06-17 NOTE — TELEPHONE ENCOUNTER
----- Message from Jemma Guerrero MA sent at 6/17/2020  3:47 PM CDT -----  Can you also fax his paper work to Attn: Pam Betancur at [phone].    Thank you,  Jemma Guerrero MA  Medical Assistant to Dr. Bebe Perez  ----- Message -----  From: Kristyn Hurtado  Sent: 6/17/2020  12:07 PM CDT  To: [facility] Staff    Pt would like to receive a call back regarding a fax that was sent over for disability. Please contact the pt to advise.    Contact info [phone]

## 2020-06-17 NOTE — TELEPHONE ENCOUNTER
Received [employer] forms from  [employer]. Forms completed and faxed to them at [phone]. Forms also faxed to Pam Betancur at [phone] per the patients request.    [facility]

## 2020-06-19 ENCOUNTER — CLINICAL SUPPORT (OUTPATIENT)
Dept: REHABILITATION | Facility: HOSPITAL | Age: 30
End: 2020-06-19
Payer: COMMERCIAL

## 2020-06-19 DIAGNOSIS — R29.898 WEAKNESS OF LEFT HIP: Primary | ICD-10-CM

## 2020-06-19 DIAGNOSIS — M25.652 STIFFNESS OF HIP JOINT, LEFT: ICD-10-CM

## 2020-06-19 DIAGNOSIS — M25.552 LEFT HIP PAIN: ICD-10-CM

## 2020-06-19 PROCEDURE — 97140 MANUAL THERAPY 1/> REGIONS: CPT

## 2020-06-19 PROCEDURE — 97010 HOT OR COLD PACKS THERAPY: CPT

## 2020-06-19 PROCEDURE — 97112 NEUROMUSCULAR REEDUCATION: CPT

## 2020-06-19 NOTE — PROGRESS NOTES
Physical Therapy Daily Treatment Note     Name: Joao BUCK Suhail  [facility] Number: [id number]    Therapy Diagnosis: No diagnosis found.  Physician: John Lantigua III, *    Visit Date: 6/19/2020  Physician Orders: PT Eval and Treat  Medical Diagnosis from Referral: S73.192A (ICD-10-CM) - Tear of left acetabular labrum, initial encounter  Evaluation Date: 6/15/2020  Authorization Period Expiration: pending  Plan of Care Expiration: 12/25/2020  Visit # / Visits authorized: 1/pending     Time In: 1330  Time Out: 1415  Total Billable Time: 45 minutes     DOS: 06/12/2020  S/p: 1. Hip arthroscopic labral repair, knotless (CPT 51899)  2. Hip arthroscopic femoral neck osteoplasty (CPT 60726)  3. Hip arthroscopic partial synovectomy/debridement (CPT 44913)  4. Hip arthroscopic acetabuloplasty (CPT 05288)  5. Hip arthroscopic chondroplasty (CPT 18469)  6. Hip arthroscopic capsular closure (CPT 92906)  7. Hip fluoroscopic guidance for needle placement/localization (CPT 94056)  8. Hip arthoscopic loose body removal      Post-Op Precautions: We will follow the hip arthroscopy with osteoplasty, capsular closure, and labral repair guidelines.  The patient will be partial weightbearing for 3 weeks.      Precautions: Standard and Weightbearing     Subjective     Pt reports: he has been having less pain since IE but has had more stiffness in hip .    He was compliant with home exercise program.  Response to previous treatment: improvement in pain   Functional change: na    Pain: 5/10  Location: left hip      Objective     Daily Measurements:   PROM   Right Left   Flexion 75 110   ER NT NT   IR NT NT         Daily Treatment       Joao received the following manual therapy techniques: Joint mobilizations were applied to the: L hip  for 15 minutes, including:  Skilled PROM to L hip to improve tolerance to ADls and IADLs, decrease chance of joint contracture and improve pain levels flexion only   Log rolling into IR to prevent  joint contracture   STM with cupping A to lumbar paraspinals   STM to hip flexor and TFL L     Joao participated in neuromuscular re-education activities to improve: Posture and motor control for 20 minutes. The following activities were included:  Ankle pumps 10x   Ankle DF gtb 40x   Glute sets 10x10s prone   Glute sets 10x10 supine   Quad sets 10x10s     Joao received hot pack for 10 minutes to L hip.      Home Exercises and Patient Education Provided     Education provided:   - cont with HEP, heat prn for hip stiffness and pain     Written Home Exercises Provided: Patient instructed to cont prior HEP.  Exercises were reviewed and Joao was able to demonstrate them prior to the end of the session.  Joao demonstrated good  understanding of the education provided.     See EMR under patient instructions for exercises given.     Assessment     The pt with good tolerance to manual treatment with improvement in hip stiffness and low back pain following. Able to complete all exercises as prescribed with little difficulty. Cont to prog as gianni.     Joao is progressing well towards his goals.     Pt will continue to benefit from skilled outpatient physical therapy to address the deficits listed in the problem list box on initial evaluation, provide pt/family education and to maximize pt's level of independence in the home and community environment. Pt prognosis is Good.     Pt's spiritual, cultural and educational needs considered and pt agreeable to plan of care and goals.    Anticipated barriers to physical therapy: None    Goals:  Short Term Goals: 2-4 weeks  1. Pt will be compliant with HEP 50% of prescribed amount.   2. The pt to demo improvement in L hip flexion PROM to at least 90 degrees for improved functional mobility.  3.  The pt will report L hip pain of <6/10 at worst to improve tolerance to therapy and functional mobility.     Long Term Goals: 20 weeks   1. Pt will be compliant with % of  prescribed amount.   2. The pt will demonstrate normal gait mechanics sans AD over level surfaces for all community distances to restore functional mobility.  3. The patient will demonstrate hip MMT scores of at least 4+/5 grossly to improve strength for weight bearing activities.  4. The patient will report pain <2/10 at worst with all exercise to improve tolerance to therapy and recreation.  5. The pt will report full participation in ADLs and IADLs without restrictions related to L hip.    Plan     Follow Dr. Perez's post operative protocol for  hip arthroscopy with osteoplasty, capsular closure, and labral repair per surgical note.    Marga Mijares, PT , DPT, SCS, FAAOMPT

## 2020-06-23 ENCOUNTER — CLINICAL SUPPORT (OUTPATIENT)
Dept: REHABILITATION | Facility: HOSPITAL | Age: 30
End: 2020-06-23
Payer: COMMERCIAL

## 2020-06-23 DIAGNOSIS — M25.552 LEFT HIP PAIN: ICD-10-CM

## 2020-06-23 DIAGNOSIS — R29.898 WEAKNESS OF LEFT HIP: ICD-10-CM

## 2020-06-23 DIAGNOSIS — M25.652 STIFFNESS OF HIP JOINT, LEFT: ICD-10-CM

## 2020-06-23 PROCEDURE — 97110 THERAPEUTIC EXERCISES: CPT

## 2020-06-23 PROCEDURE — 97140 MANUAL THERAPY 1/> REGIONS: CPT

## 2020-06-25 ENCOUNTER — NURSE TRIAGE (OUTPATIENT)
Dept: ADMINISTRATIVE | Facility: CLINIC | Age: 30
End: 2020-06-25

## 2020-06-25 ENCOUNTER — TELEPHONE (OUTPATIENT)
Dept: SPORTS MEDICINE | Facility: CLINIC | Age: 30
End: 2020-06-25

## 2020-06-25 ENCOUNTER — CLINICAL SUPPORT (OUTPATIENT)
Dept: REHABILITATION | Facility: HOSPITAL | Age: 30
End: 2020-06-25
Payer: COMMERCIAL

## 2020-06-25 DIAGNOSIS — M25.552 LEFT HIP PAIN: ICD-10-CM

## 2020-06-25 DIAGNOSIS — R29.898 WEAKNESS OF LEFT HIP: ICD-10-CM

## 2020-06-25 DIAGNOSIS — M25.652 STIFFNESS OF HIP JOINT, LEFT: ICD-10-CM

## 2020-06-25 PROCEDURE — 97112 NEUROMUSCULAR REEDUCATION: CPT

## 2020-06-25 PROCEDURE — 97140 MANUAL THERAPY 1/> REGIONS: CPT

## 2020-06-25 PROCEDURE — 97110 THERAPEUTIC EXERCISES: CPT

## 2020-06-25 RX ORDER — HYDROCODONE BITARTRATE AND ACETAMINOPHEN 10; 325 MG/1; MG/1
TABLET ORAL
Qty: 21 TABLET | Refills: 0 | Status: SHIPPED | OUTPATIENT
Start: 2020-06-25 | End: 2020-06-29 | Stop reason: SDUPTHER

## 2020-06-25 NOTE — PROGRESS NOTES
"    Physical Therapy Daily Treatment Note     Name: Joao Jang  [facility] Number: [id number]    Therapy Diagnosis:   Encounter Diagnoses   Name Primary?    Weakness of left hip     Stiffness of hip joint, left     Left hip pain      Physician: John Lantigua III, *    Visit Date: 6/25/2020  Physician Orders: PT Eval and Treat  Medical Diagnosis from Referral: S73.192A (ICD-10-CM) - Tear of left acetabular labrum, initial encounter  Evaluation Date: 6/15/2020  Authorization Period Expiration: 12/31/2020  Plan of Care Expiration: 12/25/2020  Visit # / Visits authorized: 3/20 (eval 1/1)     Time In: 1030  Time Out: 1115  Total Billable Time: 40 minutes     DOS: 06/12/2020  S/p: 1. Hip arthroscopic labral repair, knotless (CPT 13197)  2. Hip arthroscopic femoral neck osteoplasty (CPT 96556)  3. Hip arthroscopic partial synovectomy/debridement (CPT 46984)  4. Hip arthroscopic acetabuloplasty (CPT 26578)  5. Hip arthroscopic chondroplasty (CPT 29462)  6. Hip arthroscopic capsular closure (CPT 10327)  7. Hip fluoroscopic guidance for needle placement/localization (CPT 19200)  8. Hip arthoscopic loose body removal      Post-Op Precautions: We will follow the hip arthroscopy with osteoplasty, capsular closure, and labral repair guidelines.  The patient will be partial weightbearing for 3 weeks.    Precautions: Standard and Weightbearing     Subjective     Pt reports: his hip has been feeling better, more "free" since getting out of the brace.   He was compliant with home exercise program.  Response to previous treatment: improvement in pain   Functional change: improved functional mobility w/ crutches    Pain: 3/10  Location: left hip      Objective     Daily Measurements:  PROM   Left Right   ABD 20 NT   Flexion 90 110   ER NT NT   IR NT NT       Daily Treatment     Joao received the following manual therapy techniques: Joint mobilizations were applied to the: L hip for 10 minutes, including:    Skilled PROM to L " hip flexion only for symptom management and to decrease risk of joint contracture  Log rolling IR only to decrease risk of joint contracture     Joao participated in therapeutic exercises activities to improve: ROM, strength, endurance, and core stability for 15 minutes. The following activities were included:    Bike completed for 10 min no resistance to increase ROM, endurance and decrease pain to improve tolerance to ADLs and age related activities.   R LE only knee to chest 10x10s   [place] 20x     Joao participated in neuromuscular re-education activities to improve: motor control for 15 minutes. The following activities were included:    Hip ABD Isometrics with band 10x10s   Glute sets 20x10s prone   Quad sets 10x10s     Joao received hot pack for 10 minutes to low back during therex. (not charged)    Home Exercises and Patient Education Provided     Education provided:   - cont with HEP, heat prn for hip stiffness and pain     Written Home Exercises Provided: Patient instructed to cont prior HEP.  Exercises were reviewed and Joao was able to demonstrate them prior to the end of the session.  Joao demonstrated good  understanding of the education provided.     See EMR under patient instructions for exercises given.     Assessment     13  days s/p L hip labral repair    The pt tolerated new therex well with improvement in tightness and pain following. The pt required cueing to improve lumbopelvic rhythm but good carryover noted.  The pt advised to add new exercises to HEP to A with pain.     Joao is progressing well towards his goals.     Pt will continue to benefit from skilled outpatient physical therapy to address the deficits listed in the problem list box on initial evaluation, provide pt/family education and to maximize pt's level of independence in the home and community environment. Pt prognosis is Good.     Pt's spiritual, cultural and educational needs considered and pt agreeable  to plan of care and goals.    Anticipated barriers to physical therapy: None    Goals:  Short Term Goals: 2-4 weeks  1. Pt will be compliant with HEP 50% of prescribed amount.   2. The pt to demo improvement in L hip flexion PROM to at least 90 degrees for improved functional mobility.  3.  The pt will report L hip pain of <6/10 at worst to improve tolerance to therapy and functional mobility.     Long Term Goals: 20 weeks   1. Pt will be compliant with % of prescribed amount.   2. The pt will demonstrate normal gait mechanics sans AD over level surfaces for all community distances to restore functional mobility.  3. The patient will demonstrate hip MMT scores of at least 4+/5 grossly to improve strength for weight bearing activities.  4. The patient will report pain <2/10 at worst with all exercise to improve tolerance to therapy and recreation.  5. The pt will report full participation in ADLs and IADLs without restrictions related to L hip.    Plan     Follow Dr. Perez's post operative protocol for hip arthroscopy with osteoplasty, capsular closure, and labral repair per surgical note.     Marga Mijares, PT , DPT, SCS, FAAOMPT

## 2020-06-25 NOTE — TELEPHONE ENCOUNTER
Patient contacted on behalf of the Post Procedural Symptom Tracking Program. No answer. Second attempt to contact patient will occur tomorrow per post procedural protocol.       Reason for Disposition   No answer.  First attempt to contact caller.  Follow-up call scheduled within 15 minutes.    Protocols used: NO CONTACT OR DUPLICATE CONTACT CALL-A-AH

## 2020-06-25 NOTE — TELEPHONE ENCOUNTER
Spoke to patient about his pain following hip arthroscopic labral repair. He reports that his pain is improving since surgery. His question is that he has been on Norco 10mg for about 2 years and he is looking for advice on how to wean off narcotics.     I discussed with the patient that gradually decreasing the amount and frequency of the narcotic medication he takes over the course of a few weeks would be how to wean off, but I recommend him to get advice on this from the doctor that has been prescribing narcotics to him over the last 2 years.     I will refill norco for this patient now for post-op pain. I made him aware that he will not be given narcotic pain medication from us once he is 2 months out from surgery.

## 2020-06-26 NOTE — TELEPHONE ENCOUNTER
Pt contacted through Post Procedural Symptom Tracking. Denies any cough, fever, or difficulty breathing since procedure.      Reason for Disposition   Health Information question, no triage required and triager able to answer question    Additional Information   Negative: [1] Caller is not with the adult (patient) AND [2] reporting urgent symptoms   Negative: Lab result questions   Negative: Medication questions   Negative: Caller can't be reached by phone   Negative: Caller has already spoken to PCP or another triager   Negative: RN needs further essential information from caller in order to complete triage   Negative: Requesting regular office appointment   Negative: [1] Caller requesting NON-URGENT health information AND [2] PCP's office is the best resource    Protocols used: INFORMATION ONLY CALL-A-

## 2020-06-29 ENCOUNTER — OFFICE VISIT (OUTPATIENT)
Dept: SPORTS MEDICINE | Facility: CLINIC | Age: 30
End: 2020-06-29
Payer: COMMERCIAL

## 2020-06-29 VITALS
WEIGHT: 195 LBS | BODY MASS INDEX: 25.84 KG/M2 | HEART RATE: 81 BPM | HEIGHT: 73 IN | DIASTOLIC BLOOD PRESSURE: 87 MMHG | SYSTOLIC BLOOD PRESSURE: 137 MMHG

## 2020-06-29 DIAGNOSIS — G89.18 POST-OPERATIVE PAIN: ICD-10-CM

## 2020-06-29 DIAGNOSIS — M25.9 DISORDER OF HIP JOINT: ICD-10-CM

## 2020-06-29 DIAGNOSIS — S73.192A TEAR OF LEFT ACETABULAR LABRUM, INITIAL ENCOUNTER: ICD-10-CM

## 2020-06-29 DIAGNOSIS — S73.192A LABRAL TEAR OF LEFT HIP JOINT: ICD-10-CM

## 2020-06-29 PROCEDURE — 99999 PR PBB SHADOW E&M-EST. PATIENT-LVL III: CPT | Mod: PBBFAC,,, | Performed by: PHYSICIAN ASSISTANT

## 2020-06-29 PROCEDURE — 99024 POSTOP FOLLOW-UP VISIT: CPT | Mod: S$GLB,,, | Performed by: PHYSICIAN ASSISTANT

## 2020-06-29 PROCEDURE — 99024 PR POST-OP FOLLOW-UP VISIT: ICD-10-PCS | Mod: S$GLB,,, | Performed by: PHYSICIAN ASSISTANT

## 2020-06-29 PROCEDURE — 99999 PR PBB SHADOW E&M-EST. PATIENT-LVL III: ICD-10-PCS | Mod: PBBFAC,,, | Performed by: PHYSICIAN ASSISTANT

## 2020-06-29 RX ORDER — TRAMADOL HYDROCHLORIDE 50 MG/1
50-100 TABLET ORAL EVERY 6 HOURS PRN
Qty: 21 TABLET | Refills: 0 | Status: SHIPPED | OUTPATIENT
Start: 2020-06-29 | End: 2020-07-09 | Stop reason: SDUPTHER

## 2020-06-29 RX ORDER — HYDROCODONE BITARTRATE AND ACETAMINOPHEN 10; 325 MG/1; MG/1
TABLET ORAL
Qty: 21 TABLET | Refills: 0 | Status: SHIPPED | OUTPATIENT
Start: 2020-06-29 | End: 2020-07-09 | Stop reason: SDUPTHER

## 2020-06-29 NOTE — PROGRESS NOTES
Physical Therapy Daily Treatment Note     Name: Joao Jang  [facility] Number: [id number]    Therapy Diagnosis:   Encounter Diagnoses   Name Primary?    Weakness of left hip     Stiffness of hip joint, left     Left hip pain      Physician: John Lantigua III, *    Visit Date: 6/30/2020  Physician Orders: PT Eval and Treat  Medical Diagnosis from Referral: S73.192A (ICD-10-CM) - Tear of left acetabular labrum, initial encounter  Evaluation Date: 6/15/2020  Authorization Period Expiration: 12/31/2020  Plan of Care Expiration: 12/25/2020  Visit # / Visits authorized: 4/20 (eval 1/1)     Time In: 1050  Time Out: 1133  Total Billable Time: 43 minutes     DOS: 06/12/2020  S/p: 1. Hip arthroscopic labral repair, knotless (CPT 55787)  2. Hip arthroscopic femoral neck osteoplasty (CPT 16485)  3. Hip arthroscopic partial synovectomy/debridement (CPT 38413)  4. Hip arthroscopic acetabuloplasty (CPT 60014)  5. Hip arthroscopic chondroplasty (CPT 71524)  6. Hip arthroscopic capsular closure (CPT 80816)  7. Hip fluoroscopic guidance for needle placement/localization (CPT 63694)  8. Hip arthoscopic loose body removal      Post-Op Precautions: We will follow the hip arthroscopy with osteoplasty, capsular closure, and labral repair guidelines.  The patient will be partial weightbearing for 3 weeks.    Precautions: Standard and Weightbearing     Subjective     Pt reports: doing much better. Fairly sore with some throbbing on Sunday but resolved now. Saw Derrick Lantigua in clinic yesterday for first post-op appt which was unremarkable.  He was compliant with home exercise program.  Response to previous treatment: improvement in pain   Functional change: improved functional mobility w/ crutches    Pain: 2/10  Location: left hip      Objective     Daily Measurements:  PROM   Left Right   ABD 25 NT   Flexion 100 110   ER NT NT   IR 15 NT       Daily Treatment     Joao received the following manual therapy techniques: Joint  "mobilizations were applied to the: L hip for 15 minutes, including:  - Skilled PROM to L hip flexion/circumduction only for symptom management and to decrease risk of joint contracture  - Log rolling IR only to decrease risk of joint contracture     Joao participated in therapeutic exercises activities to improve: ROM, strength, endurance, and core stability for 18 minutes. The following activities were included:  - Bike completed for 10 min no resistance for L hip ROM and capacity  - R LE only knee to chest 15x10s   - [place] 30x     Joao participated in neuromuscular re-education activities to improve: motor control for 10 minutes. The following activities were included:  - Hip ABD Isometrics with strap 20x5"   - Hip ER/IR isometrics in prone 20x5" ea    Joao received hot pack for 0 minutes to low back during therex. (not charged) np    Home Exercises and Patient Education Provided     Education provided:   - cont with HEP, heat prn for hip stiffness and pain     Written Home Exercises Provided: Patient instructed to cont prior HEP.  Exercises were reviewed and Joao was able to demonstrate them prior to the end of the session.  Joao demonstrated good  understanding of the education provided.     See EMR under patient instructions for exercises given.     Assessment     18 days s/p L hip labral repair    Doing much better today. Improved comfort and ease with functional mobility at this time. No issues w/ skilled PROM or progression of isometric loading today. Plan to begin gradual weight bearing progression at the end of this week.    Joao is progressing well towards his goals.     Pt will continue to benefit from skilled outpatient physical therapy to address the deficits listed in the problem list box on initial evaluation, provide pt/family education and to maximize pt's level of independence in the home and community environment. Pt prognosis is Good.     Pt's spiritual, cultural and " educational needs considered and pt agreeable to plan of care and goals.    Anticipated barriers to physical therapy: None    Goals:  Short Term Goals: 2-4 weeks  1. Pt will be compliant with HEP 50% of prescribed amount.   2. The pt to demo improvement in L hip flexion PROM to at least 90 degrees for improved functional mobility.  3.  The pt will report L hip pain of <6/10 at worst to improve tolerance to therapy and functional mobility.     Long Term Goals: 20 weeks   1. Pt will be compliant with % of prescribed amount.   2. The pt will demonstrate normal gait mechanics sans AD over level surfaces for all community distances to restore functional mobility.  3. The patient will demonstrate hip MMT scores of at least 4+/5 grossly to improve strength for weight bearing activities.  4. The patient will report pain <2/10 at worst with all exercise to improve tolerance to therapy and recreation.  5. The pt will report full participation in ADLs and IADLs without restrictions related to L hip.    Plan     Follow Dr. Perez's post operative protocol for hip arthroscopy with osteoplasty, capsular closure, and labral repair per surgical note.     John Osborn, PT

## 2020-06-29 NOTE — PROGRESS NOTES
CC: Left hip, 2 week post-op    He is seeing us in first postop visit after;    Overall, pain is well controlled. Still feels like hip is not as strong as it should be.  Having some hip discomfort at night when trying to sleep.     He is doing well in PT.     Today the patient rates pain at a 7/10 on visual analog scale.       DATE OF PROCEDURE: 6/12/2020     SURGEON:  Bebe Perez M.D.     PROCEDURE:    Left:  1. Hip arthroscopic labral repair, knotless (CPT 09531)  2. Hip arthroscopic femoral neck osteoplasty (CPT 60712)  3. Hip arthroscopic partial synovectomy/debridement (CPT 05482)  4. Hip arthroscopic acetabuloplasty (CPT 74238)  5. Hip arthroscopic chondroplasty (CPT 27428)  6. Hip arthroscopic capsular closure (CPT 52127)  7. Hip fluoroscopic guidance for needle placement/localization (CPT 34825)  8. Hip arthoscopic loose body removal         There was evidence of chondral lesions to the: acetabulum at  6:00 position, 15 x 15mm, grade 3, chondroplasty was performed at site of chondromalacia with shaver and thermal device.  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     Loose bodies were identified in the central compartment measuring [id number] at least x 3.  Loose bodies were removed with arthroscopic grasper and shaver.         PHYSICAL EXAM / HIP  PHYSICAL EXAMINATION  General:  The patient is alert and oriented x 3.  Mood is pleasant.  Observation of ears, eyes and nose reveal no gross abnormalities.  No labored breathing observed.    Left HIP EXAMINATION     OBSERVATION / INSPECTION  Gait:   Nonantalgic   Alignment:  Neutral   Scars:   None   Muscle atrophy: None   Effusion:  None   Warmth:  None   Discoloration:   None   Leg lengths:   Equal   Pelvis:   Level     Incision sites healed well. Sutures removed today with no issues. No wound infection,  wound dehiscence, or wound drainage noted today. Area cleaned before and after with alcohol gauze pads.     Minimal effusion  No swelling, no calf tenderness                                    TENDERNESS / CREPITUS (T/C):      T / C  Trochanteric bursa   - / -  Piriformis    - / -  SI joint    - / -  Psoas tendon   - / -  Rectus insertion  - / -  Adductor insertion  - / -  Pubic symphysis  - / -    EXTREMITY NEURO-VASCULAR EXAMINATION:   Sensation:  Grossly intact to light touch all dermatomal regions.   Motor Function:  Fully intact motor function at hip, knee, foot and ankle    DTRs;  quadriceps and  achilles 2+.  No clonus and downgoing Babinski.    Vascular status:  DP and PT pulses 2+, brisk capillary refill, symmetric.    Skin:  intact, compartments soft.    OTHER FINDINGS:  - Gema's sign    ASSESSMENT:  Status post above, doing well.     PLAN:                                                                                                                                   1. Continue with PT.   2. Patient should continue crutches for another week and d/c brace.  Will refill post-op pain medication for nighttime pain.  3. I have discussed return to activity in detail.  4.Patient will see us back at 6 week post-op vito.                                    5. All questions were answered and patient should contact us if he  has any questions or concerns in the interim.

## 2020-06-30 ENCOUNTER — CLINICAL SUPPORT (OUTPATIENT)
Dept: REHABILITATION | Facility: HOSPITAL | Age: 30
End: 2020-06-30
Payer: COMMERCIAL

## 2020-06-30 DIAGNOSIS — M25.652 STIFFNESS OF HIP JOINT, LEFT: ICD-10-CM

## 2020-06-30 DIAGNOSIS — R29.898 WEAKNESS OF LEFT HIP: ICD-10-CM

## 2020-06-30 DIAGNOSIS — M25.552 LEFT HIP PAIN: ICD-10-CM

## 2020-06-30 PROCEDURE — 97110 THERAPEUTIC EXERCISES: CPT

## 2020-06-30 PROCEDURE — 97112 NEUROMUSCULAR REEDUCATION: CPT

## 2020-06-30 PROCEDURE — 97140 MANUAL THERAPY 1/> REGIONS: CPT

## 2020-07-02 ENCOUNTER — CLINICAL SUPPORT (OUTPATIENT)
Dept: REHABILITATION | Facility: HOSPITAL | Age: 30
End: 2020-07-02
Payer: COMMERCIAL

## 2020-07-02 DIAGNOSIS — M25.652 STIFFNESS OF HIP JOINT, LEFT: ICD-10-CM

## 2020-07-02 DIAGNOSIS — M25.552 LEFT HIP PAIN: ICD-10-CM

## 2020-07-02 DIAGNOSIS — R29.898 WEAKNESS OF LEFT HIP: ICD-10-CM

## 2020-07-02 PROCEDURE — 97112 NEUROMUSCULAR REEDUCATION: CPT

## 2020-07-02 PROCEDURE — 97116 GAIT TRAINING THERAPY: CPT

## 2020-07-02 PROCEDURE — 97140 MANUAL THERAPY 1/> REGIONS: CPT

## 2020-07-02 PROCEDURE — 97110 THERAPEUTIC EXERCISES: CPT

## 2020-07-02 NOTE — PROGRESS NOTES
Physical Therapy Daily Treatment Note     Name: Joao Jang  [facility] Number: [id number]    Therapy Diagnosis:   Encounter Diagnoses   Name Primary?    Weakness of left hip     Stiffness of hip joint, left     Left hip pain      Physician: John Lantigua III, *    Visit Date: 7/2/2020  Physician Orders: PT Eval and Treat  Medical Diagnosis from Referral: S73.192A (ICD-10-CM) - Tear of left acetabular labrum, initial encounter  Evaluation Date: 6/15/2020  Authorization Period Expiration: 12/31/2020  Plan of Care Expiration: 12/25/2020  Visit # / Visits authorized: 5/20 (eval 1/1)     Time In: 1220  Time Out: 1312  Total Billable Time: 50 minutes     DOS: 06/12/2020  S/p: 1. Hip arthroscopic labral repair, knotless (CPT 47854)  2. Hip arthroscopic femoral neck osteoplasty (CPT 89056)  3. Hip arthroscopic partial synovectomy/debridement (CPT 90535)  4. Hip arthroscopic acetabuloplasty (CPT 41670)  5. Hip arthroscopic chondroplasty (CPT 84829)  6. Hip arthroscopic capsular closure (CPT 43244)  7. Hip fluoroscopic guidance for needle placement/localization (CPT 18628)  8. Hip arthoscopic loose body removal      Post-Op Precautions: We will follow the hip arthroscopy with osteoplasty, capsular closure, and labral repair guidelines.  The patient will be partial weightbearing for 3 weeks.    Precautions: Standard and Weightbearing     Subjective     Pt reports: doing very well. Ready to start putting some weight on R leg. Back feeling much better.  He was compliant with home exercise program.  Response to previous treatment: improvement in pain   Functional change: improving transfers/functional mobility    Pain: 2/10  Location: left hip      Objective     Daily Measurements:  PROM   Left Right   ABD 25 NT   Flexion 100 110   ER NT NT   IR 15 NT       Daily Treatment     Joao received the following manual therapy techniques: Joint mobilizations were applied to the: L hip for 10 minutes, including:  - Skilled  "PROM to L hip flexion/circumduction only for symptom management and to decrease risk of joint contracture  - Log rolling IR only to decrease risk of joint contracture     Joao participated in therapeutic exercises activities to improve: ROM, strength, endurance, and core stability for 17 minutes. The following activities were included:  - Bike completed for 10 min no resistance for L hip ROM and capacity  - R LE only knee to chest 10x10s   - [place] 20x     Joao participated in neuromuscular re-education activities to improve: motor control for 15 minutes. The following activities were included:  - Standing hip abd 20x5"  - Prone glute set 20x5"  - Hip ABD Isometrics with strap 20x5"   - Hip ER/IR isometrics in prone 20x5" ea np    Joao completed gait training to improve functional mobility and safety for 8 minutes including:  - Linear ambulation 2 crutches 50% WB VC for heel strike, upright posture, and hip ext    Joao received hot pack for 0 minutes to low back during therex. (not charged) np    Home Exercises and Patient Education Provided     Education provided:   - cont with HEP, heat prn for hip stiffness and pain     Written Home Exercises Provided: Patient instructed to cont prior HEP.  Exercises were reviewed and Joao was able to demonstrate them prior to the end of the session.  Joao demonstrated good  understanding of the education provided.     See EMR under patient instructions for exercises given.     Assessment     20 days s/p L hip labral repair    Doing very well today. PROM per protocol w/o symptoms. Initiated weight bearing gait pattern w/o problems, only mild cueing for mechanics. Some limitation in hip extension ROM in involved extremity during contralateral swing.    Joao is progressing well towards his goals.     Pt will continue to benefit from skilled outpatient physical therapy to address the deficits listed in the problem list box on initial evaluation, provide " pt/family education and to maximize pt's level of independence in the home and community environment. Pt prognosis is Good.     Pt's spiritual, cultural and educational needs considered and pt agreeable to plan of care and goals.    Anticipated barriers to physical therapy: None    Goals:  Short Term Goals: 2-4 weeks (met)  1. Pt will be compliant with HEP 50% of prescribed amount.   2. The pt to demo improvement in L hip flexion PROM to at least 90 degrees for improved functional mobility.  3.  The pt will report L hip pain of <6/10 at worst to improve tolerance to therapy and functional mobility.     Long Term Goals: 20 weeks (progressing, not met)  1. Pt will be compliant with % of prescribed amount.   2. The pt will demonstrate normal gait mechanics sans AD over level surfaces for all community distances to restore functional mobility.  3. The patient will demonstrate hip MMT scores of at least 4+/5 grossly to improve strength for weight bearing activities.  4. The patient will report pain <2/10 at worst with all exercise to improve tolerance to therapy and recreation.  5. The pt will report full participation in ADLs and IADLs without restrictions related to L hip.    Plan     Follow Dr. Perez's post operative protocol for hip arthroscopy with osteoplasty, capsular closure, and labral repair per surgical note.     John Osborn, PT

## 2020-07-07 ENCOUNTER — CLINICAL SUPPORT (OUTPATIENT)
Dept: REHABILITATION | Facility: HOSPITAL | Age: 30
End: 2020-07-07
Payer: COMMERCIAL

## 2020-07-07 DIAGNOSIS — M25.652 STIFFNESS OF HIP JOINT, LEFT: ICD-10-CM

## 2020-07-07 DIAGNOSIS — M25.552 LEFT HIP PAIN: ICD-10-CM

## 2020-07-07 DIAGNOSIS — R29.898 WEAKNESS OF LEFT HIP: ICD-10-CM

## 2020-07-07 PROCEDURE — 97112 NEUROMUSCULAR REEDUCATION: CPT

## 2020-07-07 PROCEDURE — 97140 MANUAL THERAPY 1/> REGIONS: CPT

## 2020-07-07 PROCEDURE — 97110 THERAPEUTIC EXERCISES: CPT

## 2020-07-07 NOTE — PROGRESS NOTES
Physical Therapy Daily Treatment Note     Name: Joao Jang  [facility] Number: [id number]    Therapy Diagnosis:   Encounter Diagnoses   Name Primary?    Weakness of left hip     Stiffness of hip joint, left     Left hip pain      Physician: John Lantigua III, *    Visit Date: 7/7/2020  Physician Orders: PT Eval and Treat  Medical Diagnosis from Referral: S73.192A (ICD-10-CM) - Tear of left acetabular labrum, initial encounter  Evaluation Date: 6/15/2020  Authorization Period Expiration: 12/31/2020  Plan of Care Expiration: 12/25/2020  Visit # / Visits authorized: 6/20 (eval 1/1)     Time In: 1045  Time Out: 1133  Total Billable Time: 43 minutes     DOS: 06/12/2020  S/p: 1. Hip arthroscopic labral repair, knotless (CPT 44209)  2. Hip arthroscopic femoral neck osteoplasty (CPT 85701)  3. Hip arthroscopic partial synovectomy/debridement (CPT 58731)  4. Hip arthroscopic acetabuloplasty (CPT 65060)  5. Hip arthroscopic chondroplasty (CPT 80357)  6. Hip arthroscopic capsular closure (CPT 26003)  7. Hip fluoroscopic guidance for needle placement/localization (CPT 87331)  8. Hip arthoscopic loose body removal      Post-Op Precautions: We will follow the hip arthroscopy with osteoplasty, capsular closure, and labral repair guidelines.  The patient will be partial weightbearing for 3 weeks.    Precautions: Standard and Weightbearing     Subjective     Pt reports: doing ok. Still w/ some hip discomfort at night sometimes. Has also been weaning pn medications - first day w/o opioid use yesterday.  He was compliant with home exercise program.  Response to previous treatment: improvement in pain   Functional change: improving transfers/functional mobility    Pain: 2/10  Location: left hip      Objective     Daily Measurements:  PROM   Left Right   ABD 25 40   Flexion 100 110   ER 10 40   IR 15 40       Daily Treatment     Joao received the following manual therapy techniques: Joint mobilizations were applied to  "the: L hip for 16 minutes, including:  - Skilled PROM per protocol for symptom management and to decrease risk of joint contracture  - Log rolling IR only to decrease risk of joint contracture     Joao participated in therapeutic exercises activities to improve: ROM, strength, endurance, and core stability for 15 minutes. The following activities were included:  - Bike completed for 10 min no resistance for L hip ROM and capacity  - R LE only knee to chest 10x10s  np  - [place] 20x     Joao participated in neuromuscular re-education activities to improve: motor control for 12 minutes. The following activities were included:  - Standing hip abd 20x5"  - Prone glute set 20x5" np  - Hip ABD Isometrics with strap 20x5"  np  - Hip ER/IR isometrics in prone 20x5" ea    Joao completed gait training to improve functional mobility and safety for 5 minutes including:  - Linear ambulation 2 crutches 25% WB VC for heel strike, upright posture, and hip ext    Joao received hot pack for 0 minutes to low back during therex. (not charged) np    Home Exercises and Patient Education Provided     Education provided:   - cont with HEP, heat prn for hip stiffness and pain     Written Home Exercises Provided: Patient instructed to cont prior HEP.  Exercises were reviewed and Joao was able to demonstrate them prior to the end of the session.  Joao demonstrated good  understanding of the education provided.     See EMR under patient instructions for exercises given.     Assessment     3 weeks 4 days s/p L hip labral repair    Doing fairly well today. Has been having some increased night time symptoms that seem to be related to weaning from rx medications over past few days. States that WB progression over past few days has gone well. Discussed possibility of increased WB % as potential factor for increased night time soreness w/ pt agreeable to slow WB progression down a bit. No issues w/ PROM or isometric loading on " this date. Pt feeling better by end of session. Discussed 25% WB over next two days and monitoring of symptoms before next appt w/ Maeve on Thursday.    Joao is progressing well towards his goals.     Pt will continue to benefit from skilled outpatient physical therapy to address the deficits listed in the problem list box on initial evaluation, provide pt/family education and to maximize pt's level of independence in the home and community environment. Pt prognosis is Good.     Pt's spiritual, cultural and educational needs considered and pt agreeable to plan of care and goals.    Anticipated barriers to physical therapy: None    Goals:  Short Term Goals: 2-4 weeks (met)  1. Pt will be compliant with HEP 50% of prescribed amount.   2. The pt to demo improvement in L hip flexion PROM to at least 90 degrees for improved functional mobility.  3.  The pt will report L hip pain of <6/10 at worst to improve tolerance to therapy and functional mobility.     Long Term Goals: 20 weeks (progressing, not met)  1. Pt will be compliant with % of prescribed amount.   2. The pt will demonstrate normal gait mechanics sans AD over level surfaces for all community distances to restore functional mobility.  3. The patient will demonstrate hip MMT scores of at least 4+/5 grossly to improve strength for weight bearing activities.  4. The patient will report pain <2/10 at worst with all exercise to improve tolerance to therapy and recreation.  5. The pt will report full participation in ADLs and IADLs without restrictions related to L hip.    Plan     Follow Dr. Perez's post operative protocol for hip arthroscopy with osteoplasty, capsular closure, and labral repair per surgical note.     John Osborn, PT

## 2020-07-08 DIAGNOSIS — M25.9 DISORDER OF HIP JOINT: ICD-10-CM

## 2020-07-08 DIAGNOSIS — S73.192A TEAR OF LEFT ACETABULAR LABRUM, INITIAL ENCOUNTER: ICD-10-CM

## 2020-07-08 DIAGNOSIS — G89.18 POST-OPERATIVE PAIN: ICD-10-CM

## 2020-07-08 DIAGNOSIS — S73.192A LABRAL TEAR OF LEFT HIP JOINT: ICD-10-CM

## 2020-07-09 ENCOUNTER — CLINICAL SUPPORT (OUTPATIENT)
Dept: REHABILITATION | Facility: HOSPITAL | Age: 30
End: 2020-07-09
Payer: COMMERCIAL

## 2020-07-09 DIAGNOSIS — R29.898 WEAKNESS OF LEFT HIP: ICD-10-CM

## 2020-07-09 DIAGNOSIS — M25.552 LEFT HIP PAIN: ICD-10-CM

## 2020-07-09 DIAGNOSIS — M25.652 STIFFNESS OF HIP JOINT, LEFT: ICD-10-CM

## 2020-07-09 PROCEDURE — 97140 MANUAL THERAPY 1/> REGIONS: CPT

## 2020-07-09 PROCEDURE — 97110 THERAPEUTIC EXERCISES: CPT

## 2020-07-09 PROCEDURE — 97112 NEUROMUSCULAR REEDUCATION: CPT

## 2020-07-09 RX ORDER — TRAMADOL HYDROCHLORIDE 50 MG/1
50-100 TABLET ORAL EVERY 6 HOURS PRN
Qty: 21 TABLET | Refills: 0 | Status: SHIPPED | OUTPATIENT
Start: 2020-07-09

## 2020-07-09 RX ORDER — HYDROCODONE BITARTRATE AND ACETAMINOPHEN 10; 325 MG/1; MG/1
TABLET ORAL
Qty: 21 TABLET | Refills: 0 | OUTPATIENT
Start: 2020-07-09

## 2020-07-09 RX ORDER — HYDROCODONE BITARTRATE AND ACETAMINOPHEN 10; 325 MG/1; MG/1
TABLET ORAL
Qty: 21 TABLET | Refills: 0 | Status: SHIPPED | OUTPATIENT
Start: 2020-07-09

## 2020-07-09 RX ORDER — TRAMADOL HYDROCHLORIDE 50 MG/1
50-100 TABLET ORAL EVERY 6 HOURS PRN
Qty: 21 TABLET | Refills: 0 | OUTPATIENT
Start: 2020-07-09

## 2020-07-09 NOTE — TELEPHONE ENCOUNTER
spoke with Patient regarding medication refill, informed him request will be sent in, also spoke with him in regards to  weaning protocol. from [place] to Tramadol only per pain.

## 2020-07-09 NOTE — PROGRESS NOTES
Physical Therapy Daily Treatment Note     Name: Joao Jang  [facility] Number: [id number]    Therapy Diagnosis:   Encounter Diagnoses   Name Primary?    Weakness of left hip     Stiffness of hip joint, left     Left hip pain      Physician: John Lantigua III, *    Visit Date: 7/9/2020  Physician Orders: PT Eval and Treat  Medical Diagnosis from Referral: S73.192A (ICD-10-CM) - Tear of left acetabular labrum, initial encounter  Evaluation Date: 6/15/2020  Authorization Period Expiration: 12/31/2020  Plan of Care Expiration: 12/25/2020  Visit # / Visits authorized: 7/20 (eval 1/1)     Time In: 1030  Time Out: 1115  Total Billable Time: 30 minutes     DOS: 06/12/2020  S/p: 1. Hip arthroscopic labral repair, knotless (CPT 22245)  2. Hip arthroscopic femoral neck osteoplasty (CPT 39395)  3. Hip arthroscopic partial synovectomy/debridement (CPT 92010)  4. Hip arthroscopic acetabuloplasty (CPT 35599)  5. Hip arthroscopic chondroplasty (CPT 07096)  6. Hip arthroscopic capsular closure (CPT 71122)  7. Hip fluoroscopic guidance for needle placement/localization (CPT 78520)  8. Hip arthoscopic loose body removal      Post-Op Precautions: We will follow the hip arthroscopy with osteoplasty, capsular closure, and labral repair guidelines.  The patient will be partial weightbearing for 3 weeks.    Precautions: Standard and Weightbearing     Subjective     Pt reports: he has been feeling a lot better since dec WB to 25% after last tx session, a lot less hip pain. Reports he has some fear due to ant hip pain.   He was compliant with home exercise program.  Response to previous treatment: improvement in pain   Functional change: improving transfers/functional mobility    Pain: 2/10  Location: left hip      Objective     Daily Measurements:  PROM   Left Right   ABD 25 40   Flexion 100 110   ER 10 40   IR 15 40       Daily Treatment     Joao received the following manual therapy techniques: Joint mobilizations were  "applied to the: L hip for 16 minutes, including:  - Skilled PROM per protocol for symptom management and to decrease risk of joint contracture  - Log rolling IR only to decrease risk of joint contracture   - STM hip flexor and TFL     Joao participated in therapeutic exercises activities to improve: ROM, strength, endurance, and core stability for 10 minutes. The following activities were included:  - Bike completed for 10 min min resistance (lv3) for L hip ROM and capacity  - R LE only knee to chest 10x10s  np  - [place] 20x np    Joao participated in neuromuscular re-education activities to improve: motor control for 19 minutes. The following activities were included:  - standing hip IR with stool 20x  - sidelying hip abd 20x5"  - Prone glute set 20x5"   - Hip ABD Isometrics with strap 20x5"  np  - Hip ER/IR isometrics in prone 20x5" ea np    Joao completed gait training to improve functional mobility and safety for 00 minutes including:  - Linear ambulation 2 crutches 25% WB VC for heel strike, upright posture, and hip ext    Joao received hot pack for 0 minutes to low back during therex. (not charged) np    Home Exercises and Patient Education Provided     Education provided:   - cont with HEP, heat prn for hip stiffness and pain     Written Home Exercises Provided: Patient instructed to cont prior HEP.  Exercises were reviewed and Joao was able to demonstrate them prior to the end of the session.  Joao demonstrated good  understanding of the education provided.     See EMR under patient instructions for exercises given.     Assessment     Discussed with patient the neg ramifications of fear avoidance and pain catastrophizing. The pt with excellent reception of education and able to verbalize changes he plans to make to improve his daily thought process to his hip. The pt able to complete all exercises without pain in hip. Cont to prog as gianni.      Joao is progressing well towards his " goals.     Pt will continue to benefit from skilled outpatient physical therapy to address the deficits listed in the problem list box on initial evaluation, provide pt/family education and to maximize pt's level of independence in the home and community environment. Pt prognosis is Good.     Pt's spiritual, cultural and educational needs considered and pt agreeable to plan of care and goals.    Anticipated barriers to physical therapy: None    Goals:  Short Term Goals: 2-4 weeks (met)  1. Pt will be compliant with HEP 50% of prescribed amount.   2. The pt to demo improvement in L hip flexion PROM to at least 90 degrees for improved functional mobility.  3.  The pt will report L hip pain of <6/10 at worst to improve tolerance to therapy and functional mobility.     Long Term Goals: 20 weeks (progressing, not met)  1. Pt will be compliant with % of prescribed amount.   2. The pt will demonstrate normal gait mechanics sans AD over level surfaces for all community distances to restore functional mobility.  3. The patient will demonstrate hip MMT scores of at least 4+/5 grossly to improve strength for weight bearing activities.  4. The patient will report pain <2/10 at worst with all exercise to improve tolerance to therapy and recreation.  5. The pt will report full participation in ADLs and IADLs without restrictions related to L hip.    Plan     Follow Dr. Perez's post operative protocol for hip arthroscopy with osteoplasty, capsular closure, and labral repair per surgical note.     Marga Mijares, PT

## 2020-07-09 NOTE — TELEPHONE ENCOUNTER
----- Message from Jemma Guerrero MA sent at 7/9/2020 11:14 AM CDT -----  See below.   ----- Message -----  From: Janie Siegel MA  Sent: 7/9/2020  11:07 AM CDT  To: Jemma Guerrero MA    Please let him know that we will get the refills sent in but that he will need to wean off of the Norco to using Tramadol only.     Janie Siegel   Clinical assistant to Dr. Bebe Perez  ----- Message -----  From: Jemma Guerrero MA  Sent: 7/9/2020  10:10 AM CDT  To: aJnie Siegel MA    Were you able to get this approved?  ----- Message -----  From: Samreen Fuchs MA  Sent: 7/9/2020   8:21 AM CDT  To: Ana Spence Staff    This Patient is requesting refill on:   HYDROcodone-acetaminophen (NORCO)  mg per tablet & traMADoL (ULTRAM) 50 mg tablet.  Both last filled on 6/29/2020, Had Sx on 6/12/2020.

## 2020-07-14 ENCOUNTER — CLINICAL SUPPORT (OUTPATIENT)
Dept: REHABILITATION | Facility: HOSPITAL | Age: 30
End: 2020-07-14
Payer: COMMERCIAL

## 2020-07-14 DIAGNOSIS — M25.552 LEFT HIP PAIN: ICD-10-CM

## 2020-07-14 DIAGNOSIS — M25.652 STIFFNESS OF HIP JOINT, LEFT: ICD-10-CM

## 2020-07-14 DIAGNOSIS — R29.898 WEAKNESS OF LEFT HIP: ICD-10-CM

## 2020-07-14 PROCEDURE — 97112 NEUROMUSCULAR REEDUCATION: CPT

## 2020-07-14 PROCEDURE — 97110 THERAPEUTIC EXERCISES: CPT

## 2020-07-14 PROCEDURE — 97140 MANUAL THERAPY 1/> REGIONS: CPT

## 2020-07-14 NOTE — PROGRESS NOTES
Physical Therapy Daily Treatment Note     Name: Joao Jang  [facility] Number: [id number]    Therapy Diagnosis:   Encounter Diagnoses   Name Primary?    Weakness of left hip     Stiffness of hip joint, left     Left hip pain      Physician: John Lantigua III, *    Visit Date: 7/14/2020  Physician Orders: PT Eval and Treat  Medical Diagnosis from Referral: S73.192A (ICD-10-CM) - Tear of left acetabular labrum, initial encounter  Evaluation Date: 6/15/2020  Authorization Period Expiration: 12/31/2020  Plan of Care Expiration: 12/25/2020  Visit # / Visits authorized: 8/20 (eval 1/1)     Time In: 1048  Time Out: 1132  Total Billable Time: 44 minutes     DOS: 06/12/2020  S/p: 1. Hip arthroscopic labral repair, knotless (CPT 96940)  2. Hip arthroscopic femoral neck osteoplasty (CPT 57968)  3. Hip arthroscopic partial synovectomy/debridement (CPT 15808)  4. Hip arthroscopic acetabuloplasty (CPT 75473)  5. Hip arthroscopic chondroplasty (CPT 09712)  6. Hip arthroscopic capsular closure (CPT 37953)  7. Hip fluoroscopic guidance for needle placement/localization (CPT 48336)  8. Hip arthoscopic loose body removal      Post-Op Precautions: We will follow the hip arthroscopy with osteoplasty, capsular closure, and labral repair guidelines.  The patient will be partial weightbearing for 3 weeks.    Precautions: Standard and Weightbearing     Subjective     Pt reports: doing much better. Has been putting more weight on L leg when walking.  He was compliant with home exercise program.  Response to previous treatment: improvement in pain   Functional change: improved ambulation tolerance    Pain: 2/10  Location: left hip      Objective     Daily Measurements:  PROM    Left Right   ABD 30 40   Flexion 100 110   ER 10 40   IR 15 40       Daily Treatment     Joao received the following manual therapy techniques: Joint mobilizations were applied to the: L hip for 10 minutes, including:  - Skilled PROM per protocol for  "symptom management and to decrease risk of joint contracture  - Log rolling to decrease risk of joint contracture   - STM hip flexor and TFL np    Joao participated in therapeutic exercises activities to improve: ROM, strength, endurance, and core stability for 14 minutes. The following activities were included:  - Bike completed for 10 min min resistance (lv3) for L hip ROM and capacity  - Supine heel slide 2x20    Joao participated in neuromuscular re-education activities to improve: motor control for 20 minutes. The following activities were included:  - Standing hip IR with stool 2x20  - Standing SL balance 1 finger support 3x30" VC for glute activation  - DL bridge RTB around knees 10x5"  - Sidelying hip abd 2x10 (5")    Joao completed gait training to improve functional mobility and safety for 0 minutes including:    Joao received hot pack for 0 minutes to low back during therex. (not charged) np    Home Exercises and Patient Education Provided     Education provided:   - cont with HEP, heat prn for hip stiffness and pain     Written Home Exercises Provided: Patient instructed to cont prior HEP.  Exercises were reviewed and Joao was able to demonstrate them prior to the end of the session.  Joao demonstrated good  understanding of the education provided.     See EMR under patient instructions for exercises given.     Assessment     4 weeks 4 days s/p L hip labral repair    Doing great today. Improving tolerance to WB at this time, ambulating 75% WB w/ crutches. Good tolerance to transition to AAROM/AROM hip exercises. Notable difficulty/weakness w/ hip extension on L side, improved w/ repetition and prolonged holds. Discussed continued WB progression to tolerance w/ pt agreeable.    Joao is progressing well towards his goals.     Pt will continue to benefit from skilled outpatient physical therapy to address the deficits listed in the problem list box on initial evaluation, provide " pt/family education and to maximize pt's level of independence in the home and community environment. Pt prognosis is Good.     Pt's spiritual, cultural and educational needs considered and pt agreeable to plan of care and goals.    Anticipated barriers to physical therapy: None    Goals:  Short Term Goals: 2-4 weeks (met)  1. Pt will be compliant with HEP 50% of prescribed amount.   2. The pt to demo improvement in L hip flexion PROM to at least 90 degrees for improved functional mobility.  3.  The pt will report L hip pain of <6/10 at worst to improve tolerance to therapy and functional mobility.     Long Term Goals: 20 weeks (progressing, not met)  1. Pt will be compliant with % of prescribed amount.   2. The pt will demonstrate normal gait mechanics sans AD over level surfaces for all community distances to restore functional mobility.  3. The patient will demonstrate hip MMT scores of at least 4+/5 grossly to improve strength for weight bearing activities.  4. The patient will report pain <2/10 at worst with all exercise to improve tolerance to therapy and recreation.  5. The pt will report full participation in ADLs and IADLs without restrictions related to L hip.    Plan     Follow Dr. Perez's post operative protocol for hip arthroscopy with osteoplasty, capsular closure, and labral repair per surgical note.     John Osborn, PT

## 2020-07-16 ENCOUNTER — CLINICAL SUPPORT (OUTPATIENT)
Dept: REHABILITATION | Facility: HOSPITAL | Age: 30
End: 2020-07-16
Payer: COMMERCIAL

## 2020-07-16 DIAGNOSIS — R29.898 WEAKNESS OF LEFT HIP: ICD-10-CM

## 2020-07-16 DIAGNOSIS — M25.552 LEFT HIP PAIN: ICD-10-CM

## 2020-07-16 DIAGNOSIS — M25.652 STIFFNESS OF HIP JOINT, LEFT: ICD-10-CM

## 2020-07-16 PROCEDURE — 97140 MANUAL THERAPY 1/> REGIONS: CPT

## 2020-07-16 PROCEDURE — 97110 THERAPEUTIC EXERCISES: CPT

## 2020-07-16 NOTE — PROGRESS NOTES
Physical Therapy Daily Treatment Note     Name: Joao Jang  [facility] Number: [id number]    Therapy Diagnosis:   Encounter Diagnoses   Name Primary?    Weakness of left hip     Stiffness of hip joint, left     Left hip pain      Physician: John Lantigua III, *    Visit Date: 7/16/2020  Physician Orders: PT Eval and Treat  Medical Diagnosis from Referral: S73.192A (ICD-10-CM) - Tear of left acetabular labrum, initial encounter  Evaluation Date: 6/15/2020  Authorization Period Expiration: 12/31/2020  Plan of Care Expiration: 12/25/2020  Visit # / Visits authorized: 9/20 (eval 1/1)     Time In: 0945  Time Out: 1030  Total Billable Time: 30 minutes     DOS: 06/12/2020  S/p: 1. Hip arthroscopic labral repair, knotless (CPT 76160)  2. Hip arthroscopic femoral neck osteoplasty (CPT 00754)  3. Hip arthroscopic partial synovectomy/debridement (CPT 77763)  4. Hip arthroscopic acetabuloplasty (CPT 17342)  5. Hip arthroscopic chondroplasty (CPT 88517)  6. Hip arthroscopic capsular closure (CPT 37335)  7. Hip fluoroscopic guidance for needle placement/localization (CPT 39792)  8. Hip arthoscopic loose body removal      Post-Op Precautions: We will follow the hip arthroscopy with osteoplasty, capsular closure, and labral repair guidelines.  The patient will be partial weightbearing for 3 weeks.    Precautions: Standard and Weightbearing     Subjective     Pt reports: he has been walking without crutches and is tolerating the loading through his hip- feels more confident.   He was compliant with home exercise program.  Response to previous treatment: improvement in pain   Functional change: improved ambulation tolerance    Pain: 2/10  Location: left hip      Objective     Daily Measurements:  PROM    Left Right   ABD 30 40   Flexion 100 110   ER 10 40   IR 15 40       Daily Treatment     Joao received the following manual therapy techniques: Joint mobilizations were applied to the: L hip for 15 minutes,  "including:  - Skilled PROM per protocol for symptom management and to decrease risk of joint contracture  - Log rolling to decrease risk of joint contracture   - grade I-II inf/post hip mobs     Joao participated in therapeutic exercises activities to improve: ROM, strength, endurance, and core stability for 30 minutes. The following activities were included:  - Bike completed for 10 min min resistance (lv3) for L hip ROM and capacity  - Hand heel rocks with purple band 40x   - Shuttle DL 2 cords 4x10   - Standing hip IR with stool 2x20    Joao participated in neuromuscular re-education activities to improve: motor control for 0 minutes. The following activities were included:      NPT  - Standing SL balance 1 finger support 3x30" VC for glute activation   - DL bridge RTB around knees 10x5"  - Sidelying hip abd 2x10 (5")    Joao completed gait training to improve functional mobility and safety for 0 minutes including:    Joao received hot pack for 0 minutes to low back during therex. (not charged) np    Home Exercises and Patient Education Provided     Education provided:   - cont with HEP, heat prn for hip stiffness and pain     Written Home Exercises Provided: Patient instructed to cont prior HEP.  Exercises were reviewed and Joao was able to demonstrate them prior to the end of the session.  Joao demonstrated good  understanding of the education provided.     See EMR under patient instructions for exercises given.     Assessment     The pt able to tolerate walking in the clinic without crutches. Able to tolerate shuttle press to improve LE strength and mobility/activity tolerance without pain in hip. The pt might be a candidate for BFR in the future due to noted quad/HS atrophy.     Joao is progressing well towards his goals.     Pt will continue to benefit from skilled outpatient physical therapy to address the deficits listed in the problem list box on initial evaluation, provide pt/family " education and to maximize pt's level of independence in the home and community environment. Pt prognosis is Good.     Pt's spiritual, cultural and educational needs considered and pt agreeable to plan of care and goals.    Anticipated barriers to physical therapy: None    Goals:  Short Term Goals: 2-4 weeks (met)  1. Pt will be compliant with HEP 50% of prescribed amount.   2. The pt to demo improvement in L hip flexion PROM to at least 90 degrees for improved functional mobility.  3.  The pt will report L hip pain of <6/10 at worst to improve tolerance to therapy and functional mobility.     Long Term Goals: 20 weeks (progressing, not met)  1. Pt will be compliant with % of prescribed amount.   2. The pt will demonstrate normal gait mechanics sans AD over level surfaces for all community distances to restore functional mobility.  3. The patient will demonstrate hip MMT scores of at least 4+/5 grossly to improve strength for weight bearing activities.  4. The patient will report pain <2/10 at worst with all exercise to improve tolerance to therapy and recreation.  5. The pt will report full participation in ADLs and IADLs without restrictions related to L hip.    Plan     Follow Dr. Perez's post operative protocol for hip arthroscopy with osteoplasty, capsular closure, and labral repair per surgical note.     Marga Mijares, PT

## 2020-07-21 ENCOUNTER — CLINICAL SUPPORT (OUTPATIENT)
Dept: REHABILITATION | Facility: HOSPITAL | Age: 30
End: 2020-07-21
Payer: COMMERCIAL

## 2020-07-21 DIAGNOSIS — M25.652 STIFFNESS OF HIP JOINT, LEFT: ICD-10-CM

## 2020-07-21 DIAGNOSIS — M25.552 LEFT HIP PAIN: ICD-10-CM

## 2020-07-21 DIAGNOSIS — R29.898 WEAKNESS OF LEFT HIP: ICD-10-CM

## 2020-07-21 PROCEDURE — 97140 MANUAL THERAPY 1/> REGIONS: CPT

## 2020-07-21 PROCEDURE — 97110 THERAPEUTIC EXERCISES: CPT

## 2020-07-21 NOTE — PROGRESS NOTES
Physical Therapy Daily Treatment Note     Name: Joao Jang  [facility] Number: [id number]    Therapy Diagnosis:   Encounter Diagnoses   Name Primary?    Weakness of left hip     Stiffness of hip joint, left     Left hip pain      Physician: John Lantigua III, *    Visit Date: 7/21/2020  Physician Orders: PT Eval and Treat  Medical Diagnosis from Referral: S73.192A (ICD-10-CM) - Tear of left acetabular labrum, initial encounter  Evaluation Date: 6/15/2020  Authorization Period Expiration: 12/31/2020  Plan of Care Expiration: 12/25/2020  Visit # / Visits authorized: 10/20 (eval 1/1)     Time In: 1100  Time Out: 1148  Total Billable Time: 44 minutes     DOS: 06/12/2020  S/p: 1. Hip arthroscopic labral repair, knotless (CPT 80919)  2. Hip arthroscopic femoral neck osteoplasty (CPT 30155)  3. Hip arthroscopic partial synovectomy/debridement (CPT 62777)  4. Hip arthroscopic acetabuloplasty (CPT 20465)  5. Hip arthroscopic chondroplasty (CPT 21319)  6. Hip arthroscopic capsular closure (CPT 49336)  7. Hip fluoroscopic guidance for needle placement/localization (CPT 42092)  8. Hip arthoscopic loose body removal      Post-Op Precautions: We will follow the hip arthroscopy with osteoplasty, capsular closure, and labral repair guidelines.  The patient will be partial weightbearing for 3 weeks.    Precautions: Standard and Weightbearing     Subjective     Pt reports: doing great. Ambulating w/o AD w/ only minimal perceived asymmetry. Weaning off rx medications, plan going well.  Response to previous treatment: improvement in pain   Functional change: improved ambulation tolerance    Pain: 1/10  Location: left hip      Objective     Daily Measurements:  PROM    Left Right   ABD 30 40   Flexion 100 110   ER 15 40   IR 15 40       Daily Treatment     Joao received the following manual therapy techniques: Joint mobilizations were applied to the: L hip for 15 minutes, including:  - Skilled PROM per protocol for  "symptom management and to decrease risk of joint contracture  - Log rolling to decrease risk of joint contracture   - L hip joint mobs gr I-II inf/post    Joao participated in therapeutic exercises activities to improve: ROM, strength, endurance, and core stability for 33 minutes. The following activities were included:  - Bike completed for 10 min min resistance (lv3) for L hip ROM and capacity  - Half kneeling hip flexor stretch 10x10"  - Standing hip IR w/ stool x20  - Supine bridge w/ RTB 2x10 (5")  - Lateral stepping no resistance 2 laps ea  - Shuttle DL 3 cords 3x15    Joao participated in neuromuscular re-education activities to improve: motor control for 0 minutes. The following activities were included:    Joao completed gait training to improve functional mobility and safety for 0 minutes including:    Joao received hot pack for 0 minutes to low back during therex. (not charged) np    Home Exercises and Patient Education Provided     Education provided:   - cont with HEP, heat prn for hip stiffness and pain     Written Home Exercises Provided: Patient instructed to cont prior HEP.  Exercises were reviewed and Joao was able to demonstrate them prior to the end of the session.  Joao demonstrated good  understanding of the education provided.     See EMR under patient instructions for exercises given.     Assessment     5 weeks 4 days s/p L hip labral repair    Doing great. Ambulating w/ minimal deviation, missing a bit of hip extension that is likely contributing. Good response to initiation of hip flexor stretching today w/ desired effect achieved. Notable muscular fatigue in glute max/med w/ strengthening interventions today w/ no exacerbation of symptoms.    Joao is progressing well towards his goals.     Pt will continue to benefit from skilled outpatient physical therapy to address the deficits listed in the problem list box on initial evaluation, provide pt/family education and to " maximize pt's level of independence in the home and community environment. Pt prognosis is Good.     Pt's spiritual, cultural and educational needs considered and pt agreeable to plan of care and goals.    Anticipated barriers to physical therapy: None    Goals:  Short Term Goals: 2-4 weeks (met)  1. Pt will be compliant with HEP 50% of prescribed amount.   2. The pt to demo improvement in L hip flexion PROM to at least 90 degrees for improved functional mobility.  3.  The pt will report L hip pain of <6/10 at worst to improve tolerance to therapy and functional mobility.     Long Term Goals: 20 weeks (progressing, not met)  1. Pt will be compliant with % of prescribed amount.   2. The pt will demonstrate normal gait mechanics sans AD over level surfaces for all community distances to restore functional mobility.  3. The patient will demonstrate hip MMT scores of at least 4+/5 grossly to improve strength for weight bearing activities.  4. The patient will report pain <2/10 at worst with all exercise to improve tolerance to therapy and recreation.  5. The pt will report full participation in ADLs and IADLs without restrictions related to L hip.    Plan     Follow Dr. Perez's post operative protocol for hip arthroscopy with osteoplasty, capsular closure, and labral repair per surgical note.     John Osborn, PT

## 2020-07-23 ENCOUNTER — CLINICAL SUPPORT (OUTPATIENT)
Dept: REHABILITATION | Facility: HOSPITAL | Age: 30
End: 2020-07-23
Payer: COMMERCIAL

## 2020-07-23 DIAGNOSIS — M25.552 LEFT HIP PAIN: ICD-10-CM

## 2020-07-23 DIAGNOSIS — R29.898 WEAKNESS OF LEFT HIP: ICD-10-CM

## 2020-07-23 DIAGNOSIS — M25.652 STIFFNESS OF HIP JOINT, LEFT: ICD-10-CM

## 2020-07-23 PROCEDURE — 97110 THERAPEUTIC EXERCISES: CPT

## 2020-07-23 PROCEDURE — 97140 MANUAL THERAPY 1/> REGIONS: CPT

## 2020-07-23 PROCEDURE — 97112 NEUROMUSCULAR REEDUCATION: CPT

## 2020-07-23 NOTE — PROGRESS NOTES
Physical Therapy Daily Treatment Note     Name: Joao Jang  [facility] Number: [id number]    Therapy Diagnosis:   Encounter Diagnoses   Name Primary?    Weakness of left hip     Stiffness of hip joint, left     Left hip pain      Physician: John Lantigua III, *    Visit Date: 7/23/2020  Physician Orders: PT Eval and Treat  Medical Diagnosis from Referral: S73.192A (ICD-10-CM) - Tear of left acetabular labrum, initial encounter  Evaluation Date: 6/15/2020  Authorization Period Expiration: 12/31/2020  Plan of Care Expiration: 12/25/2020  Visit # / Visits authorized: 11/20 (eval 1/1)     Time In: 1035  Time Out: 1120  Total Billable Time: 45 minutes     DOS: 06/12/2020  S/p: 1. Hip arthroscopic labral repair, knotless (CPT 26389)  2. Hip arthroscopic femoral neck osteoplasty (CPT 00610)  3. Hip arthroscopic partial synovectomy/debridement (CPT 69440)  4. Hip arthroscopic acetabuloplasty (CPT 25759)  5. Hip arthroscopic chondroplasty (CPT 69964)  6. Hip arthroscopic capsular closure (CPT 73801)  7. Hip fluoroscopic guidance for needle placement/localization (CPT 11916)  8. Hip arthoscopic loose body removal      Post-Op Precautions: We will follow the hip arthroscopy with osteoplasty, capsular closure, and labral repair guidelines.  The patient will be partial weightbearing for 3 weeks.    Precautions: Standard and Weightbearing     Subjective     Pt reports: he has been feeling good overall, some sharp pain with certain rotatory movements.   Response to previous treatment: improvement in pain   Functional change: improved ambulation tolerance    Pain: 1/10  Location: left hip      Objective     Daily Measurements:  PROM    Left Right   ABD 30 40   Flexion 100 110   ER 15 40   IR 15 40       Daily Treatment     Joao received the following manual therapy techniques: Joint mobilizations were applied to the: L hip for 12 minutes, including:  - Skilled PROM per protocol for symptom management and to decrease  "risk of joint contracture  - Log rolling to decrease risk of joint contracture   - L hip joint mobs gr I-II inf/post    Joao participated in therapeutic exercises activities to improve: ROM, strength, endurance, and core stability for 18 minutes. The following activities were included:  - Bike completed for 10 min min resistance (lv3) for L hip ROM and capacity  - Shuttle DL 4 Cords 20x   -Shuttle SL 2 cords 4x8-10 reps B     Not today:  - Half kneeling hip flexor stretch 10x10"  - Standing hip IR w/ stool x20   - Lateral stepping no resistance 2 laps ea    Joao participated in neuromuscular re-education activities to improve: motor control for 15 minutes. The following activities were included:    SL Bridge 5x5 B  hooklying Fig 4 active ER holds 5x10s   hooklying Fig 4 IR active holds 5x10s     Joao completed gait training to improve functional mobility and safety for 0 minutes including:    Joao received hot pack for 0 minutes to low back during therex. (not charged) np    Home Exercises and Patient Education Provided     Education provided:   - cont with HEP, heat prn for hip stiffness and pain     Written Home Exercises Provided: Patient instructed to cont prior HEP.  Exercises were reviewed and Joao was able to demonstrate them prior to the end of the session.  Joao demonstrated good  understanding of the education provided.     See EMR under patient instructions for exercises given.     Assessment   5 weeks 6 days s/p L labral repair.     The pt with good tolerance to new therex that focused on improving motor control and strength of LE as well as increasing overall ROM of hip joint and surrounding musculature. The pt required minimal cueing for glute/glute med activation but good carryover noted.     Joao is progressing well towards his goals.     Pt will continue to benefit from skilled outpatient physical therapy to address the deficits listed in the problem list box on initial " evaluation, provide pt/family education and to maximize pt's level of independence in the home and community environment. Pt prognosis is Good.     Pt's spiritual, cultural and educational needs considered and pt agreeable to plan of care and goals.    Anticipated barriers to physical therapy: None    Goals:  Short Term Goals: 2-4 weeks (met)  1. Pt will be compliant with HEP 50% of prescribed amount.   2. The pt to demo improvement in L hip flexion PROM to at least 90 degrees for improved functional mobility.  3.  The pt will report L hip pain of <6/10 at worst to improve tolerance to therapy and functional mobility.     Long Term Goals: 20 weeks (progressing, not met)  1. Pt will be compliant with % of prescribed amount.   2. The pt will demonstrate normal gait mechanics sans AD over level surfaces for all community distances to restore functional mobility.  3. The patient will demonstrate hip MMT scores of at least 4+/5 grossly to improve strength for weight bearing activities.  4. The patient will report pain <2/10 at worst with all exercise to improve tolerance to therapy and recreation.  5. The pt will report full participation in ADLs and IADLs without restrictions related to L hip.    Plan     Follow Dr. Perez's post operative protocol for hip arthroscopy with osteoplasty, capsular closure, and labral repair per surgical note.     Marga Mijares, PT

## 2020-07-27 ENCOUNTER — OFFICE VISIT (OUTPATIENT)
Dept: SPORTS MEDICINE | Facility: CLINIC | Age: 30
End: 2020-07-27
Payer: COMMERCIAL

## 2020-07-27 VITALS
DIASTOLIC BLOOD PRESSURE: 87 MMHG | WEIGHT: 195 LBS | HEART RATE: 78 BPM | SYSTOLIC BLOOD PRESSURE: 147 MMHG | HEIGHT: 73 IN | BODY MASS INDEX: 25.84 KG/M2

## 2020-07-27 DIAGNOSIS — Z98.890 STATUS POST ARTHROSCOPY OF HIP: Primary | ICD-10-CM

## 2020-07-27 PROCEDURE — 99024 POSTOP FOLLOW-UP VISIT: CPT | Mod: S$GLB,,, | Performed by: ORTHOPAEDIC SURGERY

## 2020-07-27 PROCEDURE — 99999 PR PBB SHADOW E&M-EST. PATIENT-LVL III: ICD-10-PCS | Mod: PBBFAC,,, | Performed by: ORTHOPAEDIC SURGERY

## 2020-07-27 PROCEDURE — 99024 PR POST-OP FOLLOW-UP VISIT: ICD-10-PCS | Mod: S$GLB,,, | Performed by: ORTHOPAEDIC SURGERY

## 2020-07-27 PROCEDURE — 99999 PR PBB SHADOW E&M-EST. PATIENT-LVL III: CPT | Mod: PBBFAC,,, | Performed by: ORTHOPAEDIC SURGERY

## 2020-07-27 NOTE — PROGRESS NOTES
HISTORY OF PRESENT ILLNESS:   Pt is here today for post-operative followup of his hip arthroscopy.  he is doing well.  We have reviewed his findings and discussed plan of care and future treatment options.                                    Patient has been attending physical therapy at the [facility] location, working with [facility].    He notes not taking any medication for pain  He notes having a reaction over the weekend to his withdrawal medications and notes he is going to follow up with his PCP on this matter  He wanted to make us aware as the reaction caused him to run     DATE OF PROCEDURE: 6/12/2020  PROCEDURE:    Left:  1. Hip arthroscopic labral repair, knotless (CPT 58777)  2. Hip arthroscopic femoral neck osteoplasty (CPT 55897)  3. Hip arthroscopic partial synovectomy/debridement (CPT 65417)  4. Hip arthroscopic acetabuloplasty (CPT 25265)  5. Hip arthroscopic chondroplasty (CPT 87860)  6. Hip arthroscopic capsular closure (CPT 85720)  7. Hip fluoroscopic guidance for needle placement/localization (CPT 75025)  8. Hip arthoscopic loose body removal         There was evidence of chondral lesions to the: acetabulum at  6:00 position, 15 x 15mm, grade 3, chondroplasty was performed at site of chondromalacia with shaver and thermal device.  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     Loose bodies were identified in the central compartment measuring [id number] at least x 3.  Loose bodies were removed with arthroscopic grasper and shaver.                                                 PHYSICAL EXAMINATION:     Incision sites healed well  No evidence of any erythema, infection or induration  No effusion  2+ DP pulse  No swelling, no calf tenderness  - Gema's sign         Flexion: 130   ER: 15  IR without: 30  IR with: 20  ABD:  32  ADD: 20            Negative tenderness                                                                   ASSESSMENT:                                                                                                                                               1. Status post above, doing well.                                                                                                                               PLAN:                                                                                                                                                     1. Continue with PT  2. Emphasized core function.  3. I have discussed return to activity in detail, will discuss return to work status at follow up in 6 weeks.  4. he will see us back in 6 weeks with hip form and x-rays.  5. All questions were answered and he should contact us if he  has any questions or concerns in the interim.

## 2020-07-28 ENCOUNTER — CLINICAL SUPPORT (OUTPATIENT)
Dept: REHABILITATION | Facility: HOSPITAL | Age: 30
End: 2020-07-28
Payer: COMMERCIAL

## 2020-07-28 DIAGNOSIS — M25.652 STIFFNESS OF HIP JOINT, LEFT: ICD-10-CM

## 2020-07-28 DIAGNOSIS — M25.552 LEFT HIP PAIN: ICD-10-CM

## 2020-07-28 DIAGNOSIS — R29.898 WEAKNESS OF LEFT HIP: ICD-10-CM

## 2020-07-28 PROCEDURE — 97112 NEUROMUSCULAR REEDUCATION: CPT

## 2020-07-28 PROCEDURE — 97110 THERAPEUTIC EXERCISES: CPT

## 2020-07-28 PROCEDURE — 97140 MANUAL THERAPY 1/> REGIONS: CPT

## 2020-07-28 NOTE — PROGRESS NOTES
Physical Therapy Daily Treatment Note     Name: Joao Jang  [facility] Number: [id number]    Therapy Diagnosis:   Encounter Diagnoses   Name Primary?    Weakness of left hip     Stiffness of hip joint, left     Left hip pain      Physician: John Lantigua III, *    Visit Date: 7/28/2020  Physician Orders: PT Eval and Treat  Medical Diagnosis from Referral: S73.192A (ICD-10-CM) - Tear of left acetabular labrum, initial encounter  Evaluation Date: 6/15/2020  Authorization Period Expiration: 12/31/2020  Plan of Care Expiration: 12/25/2020  Visit # / Visits authorized: 12/20 (eval 1/1)     Time In: 0940  Time Out: 1020  Total Billable Time: 40 minutes     DOS: 06/12/2020  S/p: 1. Hip arthroscopic labral repair, knotless (CPT 64156)  2. Hip arthroscopic femoral neck osteoplasty (CPT 67181)  3. Hip arthroscopic partial synovectomy/debridement (CPT 23895)  4. Hip arthroscopic acetabuloplasty (CPT 58678)  5. Hip arthroscopic chondroplasty (CPT 89548)  6. Hip arthroscopic capsular closure (CPT 67646)  7. Hip fluoroscopic guidance for needle placement/localization (CPT 26041)  8. Hip arthoscopic loose body removal      Post-Op Precautions: We will follow the hip arthroscopy with osteoplasty, capsular closure, and labral repair guidelines.  The patient will be partial weightbearing for 3 weeks.    Precautions: Standard and Weightbearing     Subjective     Pt reports: that he's doing well. Saw Dr. Perez yesterday w/ no problems. States that he had a reaction to his medication withdrawal over the weekend that caused him to sprint numerous bouts in the street, but has had no subsequent issues. Spoke w/ Dr. Perez about this in post-op appt.  Response to previous treatment: improvement in pain   Functional change: improved ambulation tolerance    Pain: 1/10  Location: left hip      Objective     Daily Measurements:  PROM    Left Right   ABD 33 40   Flexion 110 110   ER 25 40   IR 15 40       Daily Treatment     Joao  "received the following manual therapy techniques: Joint mobilizations were applied to the: L hip for 10 minutes, including:  - Skilled PROM per protocol for symptom management and to decrease risk of joint contracture  - Log rolling to decrease risk of joint contracture   - L hip joint mobs gr I-II inf/post/PA    Joao participated in therapeutic exercises activities to improve: ROM, strength, endurance, and core stability for 15 minutes. The following activities were included:  - Bike completed for 8 min min resistance (lv4) for L hip ROM and capacity  - Shuttle SL 3 cords 3x10 reps bilat    Joao participated in neuromuscular re-education activities to improve: motor control for 15 minutes. The following activities were included:  - Prone hip ext knee bent 15x5"  - DL bridge alt march x10 ea  - SL bridge 10x5"  L only  - Lateral band walk 2 laps ea RTB    Joao completed gait training to improve functional mobility and safety for 0 minutes including:    Joao received hot pack for 0 minutes to low back during therex. (not charged) np    Home Exercises and Patient Education Provided     Education provided:   - cont with HEP, heat prn for hip stiffness and pain     Written Home Exercises Provided: Patient instructed to cont prior HEP.  Exercises were reviewed and Joao was able to demonstrate them prior to the end of the session.  Joao demonstrated good  understanding of the education provided.     See EMR under patient instructions for exercises given.     Assessment   6 weeks 4 days s/p L labral repair.     Doing well today despite significantly increased activity over the weekend. Improving hip joint arthrokinematics and ROM in all directions. Still w/ deficits in hip mm activation/tone, but better w/ cueing and repetition. Able to progress loading w/ most interventions w/o issue today. Improving work capacity throughout sessions w/ increased work density.    Joao is progressing well towards his " goals.     Pt will continue to benefit from skilled outpatient physical therapy to address the deficits listed in the problem list box on initial evaluation, provide pt/family education and to maximize pt's level of independence in the home and community environment. Pt prognosis is Good.     Pt's spiritual, cultural and educational needs considered and pt agreeable to plan of care and goals.    Anticipated barriers to physical therapy: None    Goals:  Short Term Goals: 2-4 weeks (met)  1. Pt will be compliant with HEP 50% of prescribed amount.   2. The pt to demo improvement in L hip flexion PROM to at least 90 degrees for improved functional mobility.  3.  The pt will report L hip pain of <6/10 at worst to improve tolerance to therapy and functional mobility.     Long Term Goals: 20 weeks (progressing, not met)  1. Pt will be compliant with % of prescribed amount.   2. The pt will demonstrate normal gait mechanics sans AD over level surfaces for all community distances to restore functional mobility.  3. The patient will demonstrate hip MMT scores of at least 4+/5 grossly to improve strength for weight bearing activities.  4. The patient will report pain <2/10 at worst with all exercise to improve tolerance to therapy and recreation.  5. The pt will report full participation in ADLs and IADLs without restrictions related to L hip.    Plan     Follow Dr. Perez's post operative protocol for hip arthroscopy with osteoplasty, capsular closure, and labral repair per surgical note.     John Osborn, PT

## 2020-08-06 ENCOUNTER — CLINICAL SUPPORT (OUTPATIENT)
Dept: REHABILITATION | Facility: HOSPITAL | Age: 30
End: 2020-08-06
Payer: COMMERCIAL

## 2020-08-06 DIAGNOSIS — R29.898 WEAKNESS OF LEFT HIP: ICD-10-CM

## 2020-08-06 DIAGNOSIS — M25.552 LEFT HIP PAIN: ICD-10-CM

## 2020-08-06 DIAGNOSIS — M25.652 STIFFNESS OF HIP JOINT, LEFT: ICD-10-CM

## 2020-08-06 PROCEDURE — 97530 THERAPEUTIC ACTIVITIES: CPT

## 2020-08-06 PROCEDURE — 97110 THERAPEUTIC EXERCISES: CPT

## 2020-08-06 PROCEDURE — 97112 NEUROMUSCULAR REEDUCATION: CPT

## 2020-08-06 NOTE — PROGRESS NOTES
Physical Therapy Daily Treatment Note     Name: Joao Jang  [facility] Number: [id number]    Therapy Diagnosis:   Encounter Diagnoses   Name Primary?    Weakness of left hip     Stiffness of hip joint, left     Left hip pain      Physician: John Lantigua III, *    Visit Date: 8/6/2020  Physician Orders: PT Eval and Treat  Medical Diagnosis from Referral: S73.192A (ICD-10-CM) - Tear of left acetabular labrum, initial encounter  Evaluation Date: 6/15/2020  Authorization Period Expiration: 12/31/2020  Visit # / Visits authorized: 13/20 (eval 1/1)     Time In: 1030  Time Out: 1108  Total Billable Time: 28 minutes     DOS: 06/12/2020  S/p: 1. Hip arthroscopic labral repair, knotless (CPT 03325)  2. Hip arthroscopic femoral neck osteoplasty (CPT 21939)  3. Hip arthroscopic partial synovectomy/debridement (CPT 26698)  4. Hip arthroscopic acetabuloplasty (CPT 98218)  5. Hip arthroscopic chondroplasty (CPT 30294)  6. Hip arthroscopic capsular closure (CPT 81412)  7. Hip fluoroscopic guidance for needle placement/localization (CPT 77799)  8. Hip arthoscopic loose body removal      Post-Op Precautions: We will follow the hip arthroscopy with osteoplasty, capsular closure, and labral repair guidelines.  The patient will be partial weightbearing for 3 weeks.    Precautions: Standard and Weightbearing     Subjective     Pt reports: the pt reports no hip pain, has been feeling good.   Response to previous treatment: improvement in pain   Functional change: improved ambulation tolerance    Pain: 1/10  Location: left hip      Objective     Daily Measurements:  PROM    Left Right   ABD 33 40   Flexion 110 110   ER 25 40   IR 15 40       Daily Treatment     Joao participated in therapeutic exercises activities to improve: ROM, strength, endurance, and core stability for 16 minutes. The following activities were included:  - Bike completed for 8 min min resistance (lv4) for L hip ROM and capacity  - Shuttle DL 10x 3  "cords   Shuttle SL 4x6 3.5 cords R/L     Joao participated in dynamic functional therapeutic activities to improve functional performance for 09  minutes, including:  Sit to stands 20" educaiton x5m   Sit to stands 24" 2x15      Joao participated in neuromuscular re-education activities to improve: motor control for 08 minutes. The following activities were included:  Lateral walks gtb <-> 4x (hip hinge posture)    Joao completed gait training to improve functional mobility and safety for 0 minutes including:    Joao received hot pack for 0 minutes to low back during therex. (not charged) np    Home Exercises and Patient Education Provided     Education provided:   - cont with HEP, heat prn for hip stiffness and pain     Written Home Exercises Provided: Patient instructed to cont prior HEP.  Exercises were reviewed and Joao was able to demonstrate them prior to the end of the session.  Joao demonstrated good  understanding of the education provided.     See EMR under patient instructions for exercises given.     Assessment   The pt demonstrated poor lumbopelvic dissociation with sit to stands demo'd increased lumbar ext and anterior pelvic tilt at initiation of movement- responded fairly well to cueing but with increased rep form fatigue set in. The pt advised to increase mobility and strength exercises at home.     Joao is progressing well towards his goals.     Pt will continue to benefit from skilled outpatient physical therapy to address the deficits listed in the problem list box on initial evaluation, provide pt/family education and to maximize pt's level of independence in the home and community environment. Pt prognosis is Good.     Pt's spiritual, cultural and educational needs considered and pt agreeable to plan of care and goals.    Anticipated barriers to physical therapy: None    Goals:  Short Term Goals: 2-4 weeks (met)  1. Pt will be compliant with HEP 50% of prescribed amount. "   2. The pt to demo improvement in L hip flexion PROM to at least 90 degrees for improved functional mobility.  3.  The pt will report L hip pain of <6/10 at worst to improve tolerance to therapy and functional mobility.     Long Term Goals: 20 weeks (progressing, not met)  1. Pt will be compliant with % of prescribed amount.   2. The pt will demonstrate normal gait mechanics sans AD over level surfaces for all community distances to restore functional mobility.  3. The patient will demonstrate hip MMT scores of at least 4+/5 grossly to improve strength for weight bearing activities.  4. The patient will report pain <2/10 at worst with all exercise to improve tolerance to therapy and recreation.  5. The pt will report full participation in ADLs and IADLs without restrictions related to L hip.    Plan     Follow Dr. Perez's post operative protocol for hip arthroscopy with osteoplasty, capsular closure, and labral repair per surgical note.     Marga Mijares, PT

## 2020-08-10 ENCOUNTER — TELEPHONE (OUTPATIENT)
Dept: SPORTS MEDICINE | Facility: CLINIC | Age: 30
End: 2020-08-10

## 2020-08-10 NOTE — TELEPHONE ENCOUNTER
----- Message from Abby Buckley sent at 8/10/2020  1:29 PM CDT -----  Regarding: PT  Contact: PT  PT needs the doctor to fax over an update on his progress and where he's at with everything for his short term disability.     Fax: [phone] Pam Betancur    Callback: [phone]

## 2020-08-11 ENCOUNTER — CLINICAL SUPPORT (OUTPATIENT)
Dept: REHABILITATION | Facility: HOSPITAL | Age: 30
End: 2020-08-11
Payer: COMMERCIAL

## 2020-08-11 DIAGNOSIS — M25.552 LEFT HIP PAIN: ICD-10-CM

## 2020-08-11 DIAGNOSIS — M25.652 STIFFNESS OF HIP JOINT, LEFT: ICD-10-CM

## 2020-08-11 DIAGNOSIS — R29.898 WEAKNESS OF LEFT HIP: ICD-10-CM

## 2020-08-11 PROCEDURE — 97110 THERAPEUTIC EXERCISES: CPT

## 2020-08-11 PROCEDURE — 97112 NEUROMUSCULAR REEDUCATION: CPT

## 2020-08-11 NOTE — PROGRESS NOTES
Physical Therapy Daily Treatment Note     Name: Joao Jang  [facility] Number: [id number]    Therapy Diagnosis:   Encounter Diagnoses   Name Primary?    Weakness of left hip     Stiffness of hip joint, left     Left hip pain      Physician: John Lantigua III, *    Visit Date: 8/11/2020  Physician Orders: PT Eval and Treat  Medical Diagnosis from Referral: S73.192A (ICD-10-CM) - Tear of left acetabular labrum, initial encounter  Evaluation Date: 6/15/2020  Authorization Period Expiration: 12/31/2020  Visit # / Visits authorized: 14/20 (eval 1/1)     Time In: 1145  Time Out: 1234  Total Billable Time: 45 minutes     DOS: 06/12/2020  S/p: 1. Hip arthroscopic labral repair, knotless (CPT 68178)  2. Hip arthroscopic femoral neck osteoplasty (CPT 86529)  3. Hip arthroscopic partial synovectomy/debridement (CPT 63904)  4. Hip arthroscopic acetabuloplasty (CPT 34792)  5. Hip arthroscopic chondroplasty (CPT 87655)  6. Hip arthroscopic capsular closure (CPT 84007)  7. Hip fluoroscopic guidance for needle placement/localization (CPT 49507)  8. Hip arthoscopic loose body removal      Post-Op Precautions: We will follow the hip arthroscopy with osteoplasty, capsular closure, and labral repair guidelines.  The patient will be partial weightbearing for 3 weeks.    Precautions: Standard and Weightbearing     Subjective     Pt reports: doing great. Hasn't really been working on HEP.  Response to previous treatment: improvement in pain   Functional change: improved ambulation tolerance    Pain: 1/10  Location: left hip      Objective     Daily Measurements:  PROM    Left Right   ABD 33 40   Flexion 110 110   ER 25 40   IR 15 40       Daily Treatment     Joao participated in therapeutic exercises activities to improve: ROM, strength, endurance, and core stability for 18 minutes. The following activities were included:  - Bike completed for 8 min min resistance (lv4) for L hip ROM and capacity  - SL mini squat 2x15 ea VC  "for level pelvis and hip abd activation    Joao participated in dynamic functional therapeutic activities to improve functional performance for 5 minutes, including:  - Sit to stands 20" 2x10 VC/TC for neutral trunk and dissociation     Joao participated in neuromuscular re-education activities to improve: motor control for 26 minutes. The following activities were included:  - Lateral walks 0t83pic ea GTB  - Supine deadbug alt heel tap 2x10 ea TC for TA set  - Alt suitcase march 2x8 ea (15-25#)  - DL cable anti-rotation press 2x10 ea (17#)    Joao completed gait training to improve functional mobility and safety for 0 minutes including:    Joao received hot pack for 0 minutes to low back during therex. (not charged) np    Home Exercises and Patient Education Provided     Education provided:   - cont with HEP, heat prn for hip stiffness and pain    Written Home Exercises Provided: Patient instructed to cont prior HEP.  Exercises were reviewed and oJao was able to demonstrate them prior to the end of the session.  Joao demonstrated good  understanding of the education provided.     See EMR under patient instructions for exercises given.     Assessment     8 weeks 4 days s/p L hip labral repair    Doing great. No issues w/ hip on this date. Continued lack of hip/trunk dissociation on this date, improved w/ cueing and education. Discussed role of doing more anti-extension work for core training as opposed to active flexion work as pt has done previously w/ pt understanding and agreeable.    Joao is progressing well towards his goals.     Pt will continue to benefit from skilled outpatient physical therapy to address the deficits listed in the problem list box on initial evaluation, provide pt/family education and to maximize pt's level of independence in the home and community environment. Pt prognosis is Good.     Pt's spiritual, cultural and educational needs considered and pt agreeable to plan " of care and goals.    Anticipated barriers to physical therapy: None    Goals:  Short Term Goals: 2-4 weeks (met)  1. Pt will be compliant with HEP 50% of prescribed amount.   2. The pt to demo improvement in L hip flexion PROM to at least 90 degrees for improved functional mobility.  3.  The pt will report L hip pain of <6/10 at worst to improve tolerance to therapy and functional mobility.     Long Term Goals: 20 weeks (progressing, not met)  1. Pt will be compliant with % of prescribed amount.   2. The pt will demonstrate normal gait mechanics sans AD over level surfaces for all community distances to restore functional mobility.  3. The patient will demonstrate hip MMT scores of at least 4+/5 grossly to improve strength for weight bearing activities.  4. The patient will report pain <2/10 at worst with all exercise to improve tolerance to therapy and recreation.  5. The pt will report full participation in ADLs and IADLs without restrictions related to L hip.    Plan     Follow Dr. Perez's post operative protocol for hip arthroscopy with osteoplasty, capsular closure, and labral repair per surgical note.     John Osborn, PT

## 2020-08-18 ENCOUNTER — CLINICAL SUPPORT (OUTPATIENT)
Dept: REHABILITATION | Facility: HOSPITAL | Age: 30
End: 2020-08-18
Payer: COMMERCIAL

## 2020-08-18 DIAGNOSIS — M25.552 LEFT HIP PAIN: ICD-10-CM

## 2020-08-18 DIAGNOSIS — M25.652 STIFFNESS OF HIP JOINT, LEFT: ICD-10-CM

## 2020-08-18 DIAGNOSIS — R29.898 WEAKNESS OF LEFT HIP: ICD-10-CM

## 2020-08-18 PROCEDURE — 97110 THERAPEUTIC EXERCISES: CPT

## 2020-08-18 PROCEDURE — 97530 THERAPEUTIC ACTIVITIES: CPT

## 2020-08-18 PROCEDURE — 97112 NEUROMUSCULAR REEDUCATION: CPT

## 2020-08-18 NOTE — PROGRESS NOTES
Physical Therapy Daily Treatment Note     Name: Joao Jang  [facility] Number: [id number]    Therapy Diagnosis:   Encounter Diagnoses   Name Primary?    Weakness of left hip     Stiffness of hip joint, left     Left hip pain      Physician: John Lantigua III, *    Visit Date: 8/18/2020  Physician Orders: PT Eval and Treat  Medical Diagnosis from Referral: S73.192A (ICD-10-CM) - Tear of left acetabular labrum, initial encounter  Evaluation Date: 6/15/2020  Authorization Period Expiration: 12/31/2020  Visit # / Visits authorized: 15/20 (eval 1/1)     Time In: 1530  Time Out: 1615  Total Billable Time: 40 minutes     DOS: 06/12/2020  S/p: 1. Hip arthroscopic labral repair, knotless (CPT 47250)  2. Hip arthroscopic femoral neck osteoplasty (CPT 06650)  3. Hip arthroscopic partial synovectomy/debridement (CPT 73064)  4. Hip arthroscopic acetabuloplasty (CPT 67352)  5. Hip arthroscopic chondroplasty (CPT 99750)  6. Hip arthroscopic capsular closure (CPT 16156)  7. Hip fluoroscopic guidance for needle placement/localization (CPT 43005)  8. Hip arthoscopic loose body removal      Post-Op Precautions: We will follow the hip arthroscopy with osteoplasty, capsular closure, and labral repair guidelines.  The patient will be partial weightbearing for 3 weeks.    Precautions: Standard and Weightbearing     Subjective     Pt reports: he was more compliant with his HEP. Overall feeling a lot better post op than pre-op at this point. Wants to be able to complete high level activities and play with his children without the deep ache his hip has now though.     Response to previous treatment: improvement in pain   Functional change: dec pain with hip flexion     Pain: 1/10  Location: left hip      Objective     Daily Measurements:  PROM    Left Right   ABD 33 40   Flexion 110 110   ER 25 40   IR 15 40       Daily Treatment     Joao participated in therapeutic exercises activities to improve: ROM, strength, endurance, and  "core stability for 12 minutes. The following activities were included:  - Bike completed for 8 min min resistance (lv4) for L hip ROM and capacity  - 1/2 kneel purple banded ant hip self mob with glute activation 15x B 3-5s holds     Joao participated in dynamic functional therapeutic activities to improve functional performance for 08 minutes, including:  - Sit to stands 20" 20x     Joao participated in neuromuscular re-education activities to improve: motor control for 22 minutes. The following activities were included:  - Hip Hinge Ed x5m   - Hip Hinge Dowel 20x   - SL RDL to cone 3x5 B     Joao completed gait training to improve functional mobility and safety for 0 minutes including:    Joao received hot pack for 0 minutes to low back during therex. (not charged) np    Home Exercises and Patient Education Provided     Education provided:   - cont with HEP, heat prn for hip stiffness and pain    Written Home Exercises Provided: Patient instructed to cont prior HEP.  Exercises were reviewed and Joao was able to demonstrate them prior to the end of the session.  Joao demonstrated good  understanding of the education provided.     See EMR under patient instructions for exercises given.     Assessment     The pt with improvement in lumbopelvic hip dissociation following tactile cueing from dowel during hip hinges. The pt with improvement in squat technique with less lumbar extension initiation and improved hip ABD/ER control during decent. Focus on increasing hip flexion depth with squats next tx session as well as continued unilateral postural control based exercises as tolerated.      Joao is progressing well towards his goals.     Pt will continue to benefit from skilled outpatient physical therapy to address the deficits listed in the problem list box on initial evaluation, provide pt/family education and to maximize pt's level of independence in the home and community environment. Pt " prognosis is Good.     Pt's spiritual, cultural and educational needs considered and pt agreeable to plan of care and goals.    Anticipated barriers to physical therapy: None    Goals:  Short Term Goals: 2-4 weeks (met)  1. Pt will be compliant with HEP 50% of prescribed amount.   2. The pt to demo improvement in L hip flexion PROM to at least 90 degrees for improved functional mobility.  3.  The pt will report L hip pain of <6/10 at worst to improve tolerance to therapy and functional mobility.     Long Term Goals: 20 weeks (progressing, not met)  1. Pt will be compliant with % of prescribed amount.   2. The pt will demonstrate normal gait mechanics sans AD over level surfaces for all community distances to restore functional mobility.  3. The patient will demonstrate hip MMT scores of at least 4+/5 grossly to improve strength for weight bearing activities.  4. The patient will report pain <2/10 at worst with all exercise to improve tolerance to therapy and recreation.  5. The pt will report full participation in ADLs and IADLs without restrictions related to L hip.    Plan     Follow Dr. Perez's post operative protocol for hip arthroscopy with osteoplasty, capsular closure, and labral repair per surgical note.     Marga Mijares, PT

## 2020-09-01 ENCOUNTER — CLINICAL SUPPORT (OUTPATIENT)
Dept: REHABILITATION | Facility: HOSPITAL | Age: 30
End: 2020-09-01
Payer: COMMERCIAL

## 2020-09-01 DIAGNOSIS — M25.552 LEFT HIP PAIN: ICD-10-CM

## 2020-09-01 DIAGNOSIS — R29.898 WEAKNESS OF LEFT HIP: ICD-10-CM

## 2020-09-01 DIAGNOSIS — M25.652 STIFFNESS OF HIP JOINT, LEFT: ICD-10-CM

## 2020-09-01 PROCEDURE — 97110 THERAPEUTIC EXERCISES: CPT

## 2020-09-01 PROCEDURE — 97530 THERAPEUTIC ACTIVITIES: CPT

## 2020-09-01 NOTE — PROGRESS NOTES
Physical Therapy Daily Treatment Note     Name: Joao Jang  [facility] Number: [id number]    Therapy Diagnosis:   Encounter Diagnoses   Name Primary?    Weakness of left hip     Stiffness of hip joint, left     Left hip pain      Physician: John Lantigua III, *    Visit Date: 9/1/2020  Physician Orders: PT Eval and Treat  Medical Diagnosis from Referral: S73.192A (ICD-10-CM) - Tear of left acetabular labrum, initial encounter  Evaluation Date: 6/15/2020  Authorization Period Expiration: 12/31/2020  Visit # / Visits authorized: 15/20 (eval 1/1)     Time In: 1530  Time Out: 1615  Total Billable Time: 40 minutes     DOS: 06/12/2020  S/p: 1. Hip arthroscopic labral repair, knotless (CPT 65408)  2. Hip arthroscopic femoral neck osteoplasty (CPT 65662)  3. Hip arthroscopic partial synovectomy/debridement (CPT 52336)  4. Hip arthroscopic acetabuloplasty (CPT 70757)  5. Hip arthroscopic chondroplasty (CPT 47821)  6. Hip arthroscopic capsular closure (CPT 02448)  7. Hip fluoroscopic guidance for needle placement/localization (CPT 69489)  8. Hip arthoscopic loose body removal      Post-Op Precautions: We will follow the hip arthroscopy with osteoplasty, capsular closure, and labral repair guidelines.  The patient will be partial weightbearing for 3 weeks.    Precautions: Standard and Weightbearing     Subjective     Pt reports: he feels 85% better than IE and overall doing well. Wants to be able to lunge/run and cut without pain.      Response to previous treatment: improvement in pain   Functional change: dec pain with hip flexion     Pain: 1/10  Location: left hip      Objective     Daily Measurements:  PROM    Left Right   ABD 33 40   Flexion 110 110   ER 25 40   IR 15 40       Daily Treatment     Joao participated in therapeutic exercises activities to improve: ROM, strength, endurance, and core stability for 22 minutes. The following activities were included:  - Elliptical completed for 8 min min resistance  (lv6 for L hip endurance and progression to running.   - 1/2 kneel purple banded ant hip self mob with glute activation 15x B 3-5s holds   - Seated Hip flexor Ecc to fatigue R/L     Joao participated in dynamic functional therapeutic activities to improve functional performance for 16 minutes, including:  - Lunge matrix x8 B   - Walking Lunges <-> 1x    Joao participated in neuromuscular re-education activities to improve: motor control for 00 minutes. The following activities were included:      Joao completed gait training to improve functional mobility and safety for 0 minutes including:    Joao received hot pack for 0 minutes to low back during therex. (not charged) np    Home Exercises and Patient Education Provided     Education provided:   - cont with HEP, heat prn for hip stiffness and pain    Written Home Exercises Provided: Patient instructed to cont prior HEP.  Exercises were reviewed and Joao was able to demonstrate them prior to the end of the session.  Joao demonstrated good  understanding of the education provided.     See EMR under patient instructions for exercises given.     Assessment   11 weeks 4 days.     The pt with good tolerance to lunges today without any hip pain and good tolt o extension. The pt is making good progress. Will progress to hopping and a running progression over the next 2 weeks sx pending.     Joao is progressing well towards his goals.     Pt will continue to benefit from skilled outpatient physical therapy to address the deficits listed in the problem list box on initial evaluation, provide pt/family education and to maximize pt's level of independence in the home and community environment. Pt prognosis is Good.     Pt's spiritual, cultural and educational needs considered and pt agreeable to plan of care and goals.    Anticipated barriers to physical therapy: None    Goals:  Short Term Goals: 2-4 weeks (met)  1. Pt will be compliant with HEP 50% of  prescribed amount.   2. The pt to demo improvement in L hip flexion PROM to at least 90 degrees for improved functional mobility.  3.  The pt will report L hip pain of <6/10 at worst to improve tolerance to therapy and functional mobility.     Long Term Goals: 20 weeks (progressing, not met)  1. Pt will be compliant with % of prescribed amount.   2. The pt will demonstrate normal gait mechanics sans AD over level surfaces for all community distances to restore functional mobility.  3. The patient will demonstrate hip MMT scores of at least 4+/5 grossly to improve strength for weight bearing activities.  4. The patient will report pain <2/10 at worst with all exercise to improve tolerance to therapy and recreation.  5. The pt will report full participation in ADLs and IADLs without restrictions related to L hip.    Plan     Follow Dr. Perez's post operative protocol for hip arthroscopy with osteoplasty, capsular closure, and labral repair per surgical note.     Marga Mijares, PT

## 2020-09-10 ENCOUNTER — CLINICAL SUPPORT (OUTPATIENT)
Dept: REHABILITATION | Facility: HOSPITAL | Age: 30
End: 2020-09-10
Attending: PHYSICIAN ASSISTANT
Payer: COMMERCIAL

## 2020-09-10 DIAGNOSIS — R29.898 WEAKNESS OF LEFT HIP: ICD-10-CM

## 2020-09-10 DIAGNOSIS — M25.552 LEFT HIP PAIN: ICD-10-CM

## 2020-09-10 DIAGNOSIS — M25.652 STIFFNESS OF HIP JOINT, LEFT: ICD-10-CM

## 2020-09-10 PROCEDURE — 97140 MANUAL THERAPY 1/> REGIONS: CPT

## 2020-09-10 PROCEDURE — 97530 THERAPEUTIC ACTIVITIES: CPT

## 2020-09-10 PROCEDURE — 97110 THERAPEUTIC EXERCISES: CPT

## 2020-09-10 NOTE — PROGRESS NOTES
Physical Therapy Daily Treatment Note     Name: Joao Jang  [facility] Number: [id number]    Therapy Diagnosis:   Encounter Diagnoses   Name Primary?    Weakness of left hip     Stiffness of hip joint, left     Left hip pain      Physician: John Lantigua III, *    Visit Date: 9/10/2020  Physician Orders: PT Eval and Treat  Medical Diagnosis from Referral: S73.192A (ICD-10-CM) - Tear of left acetabular labrum, initial encounter  Evaluation Date: 6/15/2020  Authorization Period Expiration: 12/31/2020  Visit # / Visits authorized: 17/20 (eval 1/1)     Time In: 1457  Time Out: 1545  Total Billable Time: 26 minutes     DOS: 06/12/2020  S/p: 1. Hip arthroscopic labral repair, knotless (CPT 51753)  2. Hip arthroscopic femoral neck osteoplasty (CPT 33401)  3. Hip arthroscopic partial synovectomy/debridement (CPT 67525)  4. Hip arthroscopic acetabuloplasty (CPT 48341)  5. Hip arthroscopic chondroplasty (CPT 88336)  6. Hip arthroscopic capsular closure (CPT 40444)  7. Hip fluoroscopic guidance for needle placement/localization (CPT 04192)  8. Hip arthoscopic loose body removal      Post-Op Precautions: We will follow the hip arthroscopy with osteoplasty, capsular closure, and labral repair guidelines.  The patient will be partial weightbearing for 3 weeks.    Precautions: Standard and Weightbearing     Subjective     Pt reports: doing really well. Has been working out at gym some. Therapy attendance has been limited by personal issues outside of clinic.  Response to previous treatment: improvement in pain   Functional change: dec pain with hip flexion     Pain: 1/10  Location: left hip      Objective     Daily Measurements:  PROM    Left Right   ABD 33 40   Flexion 110 110   ER 25 40   IR 15 40       Daily Treatment     Joao received manual therapy techniques including joint mobilizations to the L hip for 8 minutes including:  - L hip inferior glide gr V    Joao participated in therapeutic exercises activities  "to improve: ROM, strength, endurance, and core stability for 24 minutes. The following activities were included:  - Upright bike x6' level 5.0 for hip ROM/strength  - Quadruped rock back 15x5" TC for neutral trunk  - BW squat heels elevated 2x10 VC for trunk position and depth limitation  - Reverse lunge 2x10 ea    Joao participated in dynamic functional therapeutic activities to improve functional performance for 16 minutes, including:  - Shuttle DL jump 2x20 1.5 straps  - Shuttle alt SL bound 2x30 ea 1 strap  - SL mini squat constant tempo 2x45" ea    Joao participated in neuromuscular re-education activities to improve: motor control for 00 minutes. The following activities were included:    Joao completed gait training to improve functional mobility and safety for 0 minutes including:    Joao received hot pack for 0 minutes to low back during therex. (not charged) np    Home Exercises and Patient Education Provided     Education provided:   - cont with HEP, heat prn for hip stiffness and pain    Written Home Exercises Provided: Patient instructed to cont prior HEP.  Exercises were reviewed and Joao was able to demonstrate them prior to the end of the session.  Joao demonstrated good  understanding of the education provided.     See EMR under patient instructions for exercises given.     Assessment     S/p L hip labral repair    Doing great. Able to initiate pre-running activity today including plyometric work on shuttle and endurance based hip activities. Notable fatigue and endurance limitations noted. Very good improvements in trunk control and hip flexion ROM w/ squat and lunge interventions.      Joao is progressing well towards his goals.     Pt will continue to benefit from skilled outpatient physical therapy to address the deficits listed in the problem list box on initial evaluation, provide pt/family education and to maximize pt's level of independence in the home and community " environment. Pt prognosis is Good.     Pt's spiritual, cultural and educational needs considered and pt agreeable to plan of care and goals.    Anticipated barriers to physical therapy: None    Goals:  Short Term Goals: 2-4 weeks (met)  1. Pt will be compliant with HEP 50% of prescribed amount.   2. The pt to demo improvement in L hip flexion PROM to at least 90 degrees for improved functional mobility.  3.  The pt will report L hip pain of <6/10 at worst to improve tolerance to therapy and functional mobility.     Long Term Goals: 20 weeks (progressing, not met)  1. Pt will be compliant with % of prescribed amount.   2. The pt will demonstrate normal gait mechanics sans AD over level surfaces for all community distances to restore functional mobility.  3. The patient will demonstrate hip MMT scores of at least 4+/5 grossly to improve strength for weight bearing activities.  4. The patient will report pain <2/10 at worst with all exercise to improve tolerance to therapy and recreation.  5. The pt will report full participation in ADLs and IADLs without restrictions related to L hip.    Plan     Follow Dr. Perez's post operative protocol for hip arthroscopy with osteoplasty, capsular closure, and labral repair per surgical note.     John Osborn, PT

## 2020-09-16 ENCOUNTER — PATIENT MESSAGE (OUTPATIENT)
Dept: SPORTS MEDICINE | Facility: CLINIC | Age: 30
End: 2020-09-16

## 2020-09-21 ENCOUNTER — OFFICE VISIT (OUTPATIENT)
Dept: SPORTS MEDICINE | Facility: CLINIC | Age: 30
End: 2020-09-21
Payer: COMMERCIAL

## 2020-09-21 ENCOUNTER — HOSPITAL ENCOUNTER (OUTPATIENT)
Dept: RADIOLOGY | Facility: HOSPITAL | Age: 30
Discharge: HOME | End: 2020-09-21
Attending: ORTHOPAEDIC SURGERY
Payer: COMMERCIAL

## 2020-09-21 VITALS
BODY MASS INDEX: 28.2 KG/M2 | HEIGHT: 73 IN | DIASTOLIC BLOOD PRESSURE: 86 MMHG | WEIGHT: 212.81 LBS | SYSTOLIC BLOOD PRESSURE: 148 MMHG | HEART RATE: 77 BPM

## 2020-09-21 DIAGNOSIS — M25.552 LEFT HIP PAIN: ICD-10-CM

## 2020-09-21 DIAGNOSIS — Z98.890 STATUS POST ARTHROSCOPY OF HIP: ICD-10-CM

## 2020-09-21 DIAGNOSIS — M25.552 LEFT HIP PAIN: Primary | ICD-10-CM

## 2020-09-21 PROCEDURE — 3008F PR BODY MASS INDEX (BMI) DOCUMENTED: ICD-10-PCS | Mod: CPTII,S$GLB,, | Performed by: ORTHOPAEDIC SURGERY

## 2020-09-21 PROCEDURE — 73503 XR HIP 4 OR MORE VIEWS LEFT: ICD-10-PCS | Mod: 26,LT,, | Performed by: RADIOLOGY

## 2020-09-21 PROCEDURE — 99214 OFFICE O/P EST MOD 30 MIN: CPT | Mod: S$GLB,,, | Performed by: ORTHOPAEDIC SURGERY

## 2020-09-21 PROCEDURE — 99999 PR PBB SHADOW E&M-EST. PATIENT-LVL III: CPT | Mod: PBBFAC,,, | Performed by: ORTHOPAEDIC SURGERY

## 2020-09-21 PROCEDURE — 73503 X-RAY EXAM HIP UNI 4/> VIEWS: CPT | Mod: 26,LT,, | Performed by: RADIOLOGY

## 2020-09-21 PROCEDURE — 3008F BODY MASS INDEX DOCD: CPT | Mod: CPTII,S$GLB,, | Performed by: ORTHOPAEDIC SURGERY

## 2020-09-21 PROCEDURE — 99999 PR PBB SHADOW E&M-EST. PATIENT-LVL III: ICD-10-PCS | Mod: PBBFAC,,, | Performed by: ORTHOPAEDIC SURGERY

## 2020-09-21 PROCEDURE — 99214 PR OFFICE/OUTPT VISIT, EST, LEVL IV, 30-39 MIN: ICD-10-PCS | Mod: S$GLB,,, | Performed by: ORTHOPAEDIC SURGERY

## 2020-09-21 PROCEDURE — 73503 X-RAY EXAM HIP UNI 4/> VIEWS: CPT | Mod: TC,LT

## 2020-09-21 NOTE — LETTER
Patient: Joao Jang   YOB: 1990   Clinic Number: [id number]   Today's Date: September 21, 2020        Certificate to Return to Work     Joao was seen by Bebe Perez MD on 9/21/2020.    Joao can return to work, full duty, with no restrictions.    If you have any questions or concerns, please feel free to contact the office at [phone].    Thank you.    Bebe Perez MD        Signature: __________________________________________________

## 2020-09-21 NOTE — PROGRESS NOTES
CC: Left hip pain     HISTORY OF PRESENT ILLNESS:   Pt is here today for followup of his hip arthroscopy.  he is doing well.  We have reviewed his findings and discussed plan of care and future treatment options.                                    Patient has been attending physical therapy at the [facility] location, working with [facility].   He notes performing his HEP as there are many times that he is not able to attend therapy due to his schedule    SANE pre op: 20  SANE post op: 95    DATE OF PROCEDURE: 6/12/2020  PROCEDURE:    Left:  1. Hip arthroscopic labral repair, knotless (CPT 33432)  2. Hip arthroscopic femoral neck osteoplasty (CPT 78299)  3. Hip arthroscopic partial synovectomy/debridement (CPT 65149)  4. Hip arthroscopic acetabuloplasty (CPT 73360)  5. Hip arthroscopic chondroplasty (CPT 93188)  6. Hip arthroscopic capsular closure (CPT 77588)  7. Hip fluoroscopic guidance for needle placement/localization (CPT 84033)  8. Hip arthoscopic loose body removal         There was evidence of chondral lesions to the: acetabulum at  6:00 position, 15 x 15mm, grade 3, chondroplasty was performed at site of chondromalacia with shaver and thermal device.  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     Loose bodies were identified in the central compartment measuring [id number] at least x 3.  Loose bodies were removed with arthroscopic grasper and shaver.          Review of Systems   Constitution: Negative. Negative for chills, fever and night sweats.   HENT: Negative for congestion and headaches.    Eyes: Negative for blurred vision, left vision loss and right vision loss.   Cardiovascular: Negative for chest pain and syncope.   Respiratory: Negative for cough and shortness of breath.    Endocrine: Negative for polydipsia, polyphagia and  polyuria.   Hematologic/Lymphatic: Negative for bleeding problem. Does not bruise/bleed easily.   Skin: Negative for dry skin, itching and rash.   Musculoskeletal: Negative for falls and muscle weakness.   Gastrointestinal: Negative for abdominal pain and bowel incontinence.   Genitourinary: Negative for bladder incontinence and nocturia.   Neurological: Negative for disturbances in coordination, loss of balance and seizures.   Psychiatric/Behavioral: Negative for depression. The patient does not have insomnia.    Allergic/Immunologic: Negative for hives and persistent infections.       PAST MEDICAL HISTORY: No past medical history on file.  PAST SURGICAL HISTORY:   Past Surgical History:   Procedure Laterality Date    ACETABULOPLASTY Left 6/12/2020    Procedure: ACETABULOPLASTY;  Surgeon: Bebe Perez MD;  Location: [facility] OR;  Service: Orthopedics;  Laterality: Left;    ARTHROSCOPIC FEMOROPLASTY Left 6/12/2020    Procedure: FEMOROPLASTY, ARTHROSCOPIC;  Surgeon: Bebe Perez MD;  Location: [facility] OR;  Service: Orthopedics;  Laterality: Left;    HIP SURGERY      KNEE ARTHROSCOPY      REPAIR OF LABRUM OF HIP  6/12/2020    Procedure: REPAIR, LABRAL, HIP;  Surgeon: Bebe Perez MD;  Location: [facility] OR;  Service: Orthopedics;;    SYNOVECTOMY OF HIP Left 6/12/2020    Procedure: SYNOVECTOMY, HIP;  Surgeon: Bebe Perez MD;  Location: [facility] OR;  Service: Orthopedics;  Laterality: Left;     FAMILY HISTORY:   Family History   Problem Relation Age of Onset    No Known Problems Mother     Hypertension Father      SOCIAL HISTORY:   Social History     Socioeconomic History    Marital status:      Spouse name: Not on file    Number of children: Not on file    Years of education: Not on file    Highest education level: Not on file   Occupational History    Not on file   Social Needs    Financial resource strain: Not on file    Food insecurity     Worry: Not on file     Inability: Not on file    Transportation needs      Medical: Not on file     Non-medical: Not on file   Tobacco Use    Smoking status: Never Smoker   Substance and Sexual Activity    Alcohol use: Not on file    Drug use: Not on file    Sexual activity: Not on file   Lifestyle    Physical activity     Days per week: Not on file     Minutes per session: Not on file    Stress: Not on file   Relationships    Social connections     Talks on phone: Not on file     Gets together: Not on file     Attends [employer] service: Not on file     Active member of club or organization: Not on file     Attends meetings of clubs or organizations: Not on file     Relationship status: Not on file   Other Topics Concern    Not on file   Social History Narrative    Not on file       MEDICATIONS:   Current Outpatient Medications:     HYDROcodone-acetaminophen (NORCO)  mg per tablet, Take 1 tablet by mouth every 4-6 hours for pain. (Patient not taking: Reported on 7/27/2020), Disp: 21 tablet, Rfl: 0    indomethacin (INDOCIN) 25 MG capsule, Take 3 capsules (75 mg total) by mouth once daily. Take with food. Take on post-op days 1,2,3,&4. (Patient not taking: Reported on 7/27/2020), Disp: 12 capsule, Rfl: 0    ketorolac (TORADOL) 10 mg tablet, Take 1 tablet (10 mg total) by mouth every 6 (six) hours as needed (breakthrough pain). (Patient not taking: Reported on 7/27/2020), Disp: 12 tablet, Rfl: 0    lisdexamfetamine (VYVANSE) 50 MG capsule, Take 50 mg by mouth every morning., Disp: , Rfl:     omeprazole (PRILOSEC) 20 MG capsule, Take 1 capsule (20 mg total) by mouth once daily., Disp: 30 capsule, Rfl: 0    pantoprazole (PROTONIX) 20 MG tablet, Take 20 mg by mouth once daily., Disp: , Rfl:     promethazine (PHENERGAN) 25 MG tablet, Take 1 tablet (25 mg total) by mouth every 6 (six) hours as needed for Nausea. (Patient not taking: Reported on 7/27/2020), Disp: 12 tablet, Rfl: 0    tizanidine (ZANAFLEX) 4 MG tablet, , Disp: , Rfl:     traMADoL (ULTRAM) 50 mg tablet, Take  "1-2 tablets ( mg total) by mouth every 6 (six) hours as needed. (Patient not taking: Reported on 7/27/2020), Disp: 21 tablet, Rfl: 0  ALLERGIES:   Review of patient's allergies indicates:   Allergen Reactions    Valium [diazepam]      Pt states "makes me crazy"       VITAL SIGNS: BP (!) 148/86   Pulse 77   Ht 6' 1" (1.854 m)   Wt 96.5 kg (212 lb 12.8 oz)   BMI 28.08 kg/m²                                               PHYSICAL EXAMINATION:     Incision sites healed well  No evidence of any erythema, infection or induration  No effusion  2+ DP pulse  No swelling, no calf tenderness  - Gema's sign         Flexion: 130   ER: 30  IR without: 40  IR with: 30  ABD: 45  ADD: 20            Negative tenderness   Mild + bridge  + step down                                                                   ASSESSMENT:                                                                                                                                               1. Status post above, doing well.                                                                                                                               PLAN:                                                                                                                                                     1. Continue with PT  2. Emphasized core function.  3. I have discussed return to activity in detail, released to return to work, full duty, without restrictions.  4. he will see us back in 3 months with hip form.  5. All questions were answered and he should contact us if he  has any questions or concerns in the interim.     6. Weight loss was discussed with the patient today. Goal set for him  to weigh 190 lbs.                             "

## 2020-09-26 ENCOUNTER — PATIENT MESSAGE (OUTPATIENT)
Dept: SPORTS MEDICINE | Facility: CLINIC | Age: 30
End: 2020-09-26

## 2020-12-21 ENCOUNTER — OFFICE VISIT (OUTPATIENT)
Dept: SPORTS MEDICINE | Facility: CLINIC | Age: 30
End: 2020-12-21
Payer: COMMERCIAL

## 2020-12-21 VITALS
SYSTOLIC BLOOD PRESSURE: 146 MMHG | HEIGHT: 73 IN | WEIGHT: 201 LBS | HEART RATE: 91 BPM | BODY MASS INDEX: 26.64 KG/M2 | DIASTOLIC BLOOD PRESSURE: 83 MMHG

## 2020-12-21 DIAGNOSIS — Z98.890 STATUS POST ARTHROSCOPY OF HIP: Primary | ICD-10-CM

## 2020-12-21 PROCEDURE — 3008F PR BODY MASS INDEX (BMI) DOCUMENTED: ICD-10-PCS | Mod: CPTII,S$GLB,, | Performed by: ORTHOPAEDIC SURGERY

## 2020-12-21 PROCEDURE — 99999 PR PBB SHADOW E&M-EST. PATIENT-LVL III: CPT | Mod: PBBFAC,,, | Performed by: ORTHOPAEDIC SURGERY

## 2020-12-21 PROCEDURE — 1126F PR PAIN SEVERITY QUANTIFIED, NO PAIN PRESENT: ICD-10-PCS | Mod: S$GLB,,, | Performed by: ORTHOPAEDIC SURGERY

## 2020-12-21 PROCEDURE — 99214 OFFICE O/P EST MOD 30 MIN: CPT | Mod: S$GLB,,, | Performed by: ORTHOPAEDIC SURGERY

## 2020-12-21 PROCEDURE — 99999 PR PBB SHADOW E&M-EST. PATIENT-LVL III: ICD-10-PCS | Mod: PBBFAC,,, | Performed by: ORTHOPAEDIC SURGERY

## 2020-12-21 PROCEDURE — 1126F AMNT PAIN NOTED NONE PRSNT: CPT | Mod: S$GLB,,, | Performed by: ORTHOPAEDIC SURGERY

## 2020-12-21 PROCEDURE — 3008F BODY MASS INDEX DOCD: CPT | Mod: CPTII,S$GLB,, | Performed by: ORTHOPAEDIC SURGERY

## 2020-12-21 PROCEDURE — 99214 PR OFFICE/OUTPT VISIT, EST, LEVL IV, 30-39 MIN: ICD-10-PCS | Mod: S$GLB,,, | Performed by: ORTHOPAEDIC SURGERY

## 2020-12-21 NOTE — PROGRESS NOTES
CC: Left hip pain     HISTORY OF PRESENT ILLNESS:   Pt is here today for followup of his hip arthroscopy.  he is doing well.  We have reviewed his findings and discussed plan of care and future treatment options.                                    Patient was attending physical therapy at the [facility] location, working with Christiano.   He notes performing his HEP as there are many times that he is not able to attend therapy due to his schedule    SANE pre op: 20  SANE post op: 95    DATE OF PROCEDURE: 6/12/2020  PROCEDURE:    Left:  1. Hip arthroscopic labral repair, knotless (CPT 39209)  2. Hip arthroscopic femoral neck osteoplasty (CPT 30876)  3. Hip arthroscopic partial synovectomy/debridement (CPT 42551)  4. Hip arthroscopic acetabuloplasty (CPT 79647)  5. Hip arthroscopic chondroplasty (CPT 77928)  6. Hip arthroscopic capsular closure (CPT 02939)  7. Hip fluoroscopic guidance for needle placement/localization (CPT 08114)  8. Hip arthoscopic loose body removal         There was evidence of chondral lesions to the: acetabulum at  6:00 position, 15 x 15mm, grade 3, chondroplasty was performed at site of chondromalacia with shaver and thermal device.  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     Loose bodies were identified in the central compartment measuring [id number] at least x 3.  Loose bodies were removed with arthroscopic grasper and shaver.          Review of Systems   Constitution: Negative. Negative for chills, fever and night sweats.   HENT: Negative for congestion and headaches.    Eyes: Negative for blurred vision, left vision loss and right vision loss.   Cardiovascular: Negative for chest pain and syncope.   Respiratory: Negative for cough and shortness of breath.    Endocrine: Negative for polydipsia, polyphagia and  polyuria.   Hematologic/Lymphatic: Negative for bleeding problem. Does not bruise/bleed easily.   Skin: Negative for dry skin, itching and rash.   Musculoskeletal: Negative for falls and muscle weakness.   Gastrointestinal: Negative for abdominal pain and bowel incontinence.   Genitourinary: Negative for bladder incontinence and nocturia.   Neurological: Negative for disturbances in coordination, loss of balance and seizures.   Psychiatric/Behavioral: Negative for depression. The patient does not have insomnia.    Allergic/Immunologic: Negative for hives and persistent infections.       PAST MEDICAL HISTORY: History reviewed. No pertinent past medical history.  PAST SURGICAL HISTORY:   Past Surgical History:   Procedure Laterality Date    ACETABULOPLASTY Left 6/12/2020    Procedure: ACETABULOPLASTY;  Surgeon: Bebe Perez MD;  Location: [facility] OR;  Service: Orthopedics;  Laterality: Left;    ARTHROSCOPIC FEMOROPLASTY Left 6/12/2020    Procedure: FEMOROPLASTY, ARTHROSCOPIC;  Surgeon: Bebe Perez MD;  Location: [facility] OR;  Service: Orthopedics;  Laterality: Left;    HIP SURGERY      KNEE ARTHROSCOPY      REPAIR OF LABRUM OF HIP  6/12/2020    Procedure: REPAIR, LABRAL, HIP;  Surgeon: Bebe Perez MD;  Location: [facility] OR;  Service: Orthopedics;;    SYNOVECTOMY OF HIP Left 6/12/2020    Procedure: SYNOVECTOMY, HIP;  Surgeon: Bebe Perez MD;  Location: [facility] OR;  Service: Orthopedics;  Laterality: Left;     FAMILY HISTORY:   Family History   Problem Relation Age of Onset    No Known Problems Mother     Hypertension Father      SOCIAL HISTORY:   Social History     Socioeconomic History    Marital status:      Spouse name: Not on file    Number of children: Not on file    Years of education: Not on file    Highest education level: Not on file   Occupational History    Not on file   Social Needs    Financial resource strain: Not on file    Food insecurity     Worry: Not on file     Inability: Not on file     Transportation needs     Medical: Not on file     Non-medical: Not on file   Tobacco Use    Smoking status: Never Smoker   Substance and Sexual Activity    Alcohol use: Not on file    Drug use: Not on file    Sexual activity: Not on file   Lifestyle    Physical activity     Days per week: Not on file     Minutes per session: Not on file    Stress: Not on file   Relationships    Social connections     Talks on phone: Not on file     Gets together: Not on file     Attends Denominational service: Not on file     Active member of club or organization: Not on file     Attends meetings of clubs or organizations: Not on file     Relationship status: Not on file   Other Topics Concern    Not on file   Social History Narrative    Not on file       MEDICATIONS:   Current Outpatient Medications:     lisdexamfetamine (VYVANSE) 50 MG capsule, Take 50 mg by mouth every morning., Disp: , Rfl:     HYDROcodone-acetaminophen (NORCO)  mg per tablet, Take 1 tablet by mouth every 4-6 hours for pain. (Patient not taking: Reported on 7/27/2020), Disp: 21 tablet, Rfl: 0    indomethacin (INDOCIN) 25 MG capsule, Take 3 capsules (75 mg total) by mouth once daily. Take with food. Take on post-op days 1,2,3,&4. (Patient not taking: Reported on 7/27/2020), Disp: 12 capsule, Rfl: 0    ketorolac (TORADOL) 10 mg tablet, Take 1 tablet (10 mg total) by mouth every 6 (six) hours as needed (breakthrough pain). (Patient not taking: Reported on 7/27/2020), Disp: 12 tablet, Rfl: 0    omeprazole (PRILOSEC) 20 MG capsule, Take 1 capsule (20 mg total) by mouth once daily., Disp: 30 capsule, Rfl: 0    pantoprazole (PROTONIX) 20 MG tablet, Take 20 mg by mouth once daily., Disp: , Rfl:     promethazine (PHENERGAN) 25 MG tablet, Take 1 tablet (25 mg total) by mouth every 6 (six) hours as needed for Nausea. (Patient not taking: Reported on 7/27/2020), Disp: 12 tablet, Rfl: 0    tizanidine (ZANAFLEX) 4 MG tablet, , Disp: , Rfl:     traMADoL  "(ULTRAM) 50 mg tablet, Take 1-2 tablets ( mg total) by mouth every 6 (six) hours as needed. (Patient not taking: Reported on 7/27/2020), Disp: 21 tablet, Rfl: 0  ALLERGIES:   Review of patient's allergies indicates:   Allergen Reactions    Valium [diazepam]      Pt states "makes me crazy"       VITAL SIGNS: BP (!) 146/83   Pulse 91   Ht 6' 1" (1.854 m)   Wt 91.2 kg (201 lb)   BMI 26.52 kg/m²                                               PHYSICAL EXAMINATION:     Incision sites healed well  No evidence of any erythema, infection or induration  No effusion  2+ DP pulse  No swelling, no calf tenderness  - Gema's sign         Flexion: 130   ER: 30  IR without: 40  IR with: 30  ABD: 45  ADD: 20            Negative tenderness   + bridge  + step down                                                                   ASSESSMENT:                                                                                                                                               1. Status post above, doing well.                                                                                                                               PLAN:                                                                                                                                                     1. Continue with HEP  2. Emphasized core function.  3. I have discussed return to activity in detail.  4. he will see us back in 6 months with hip form.   5. All questions were answered and he should contact us if he  has any questions or concerns in the interim.     6. Weight loss was discussed with the patient today. Current goal still for him  to weigh 190 lbs.                               "

## 2021-04-20 ENCOUNTER — HOSPITAL ENCOUNTER (EMERGENCY)
Facility: HOSPITAL | Age: 31
Discharge: HOME | End: 2021-04-21
Attending: EMERGENCY MEDICINE
Payer: COMMERCIAL

## 2021-04-20 VITALS
OXYGEN SATURATION: 97 % | WEIGHT: 201 LBS | HEART RATE: 77 BPM | DIASTOLIC BLOOD PRESSURE: 84 MMHG | SYSTOLIC BLOOD PRESSURE: 144 MMHG | TEMPERATURE: 99 F | RESPIRATION RATE: 18 BRPM | BODY MASS INDEX: 26.52 KG/M2

## 2021-04-20 DIAGNOSIS — R10.9 ABDOMINAL CRAMPING: Primary | ICD-10-CM

## 2021-04-20 LAB
ALBUMIN SERPL BCP-MCNC: 4.1 G/DL (ref 3.5–5.2)
ALP SERPL-CCNC: 60 U/L (ref 55–135)
ALT SERPL W/O P-5'-P-CCNC: 39 U/L (ref 10–44)
ANION GAP SERPL CALC-SCNC: 10 MMOL/L (ref 8–16)
AST SERPL-CCNC: 39 U/L (ref 10–40)
BASOPHILS # BLD AUTO: 0.05 K/UL (ref 0–0.2)
BASOPHILS NFR BLD: 0.9 % (ref 0–1.9)
BILIRUB SERPL-MCNC: 0.4 MG/DL (ref 0.1–1)
BILIRUB UR QL STRIP: NEGATIVE
BUN SERPL-MCNC: 9 MG/DL (ref 6–20)
CALCIUM SERPL-MCNC: 8.9 MG/DL (ref 8.7–10.5)
CHLORIDE SERPL-SCNC: 105 MMOL/L (ref 95–110)
CLARITY UR REFRACT.AUTO: CLEAR
CO2 SERPL-SCNC: 22 MMOL/L (ref 23–29)
COLOR UR AUTO: NORMAL
CREAT SERPL-MCNC: 1.3 MG/DL (ref 0.5–1.4)
DIFFERENTIAL METHOD: ABNORMAL
EOSINOPHIL # BLD AUTO: 0.1 K/UL (ref 0–0.5)
EOSINOPHIL NFR BLD: 1.2 % (ref 0–8)
ERYTHROCYTE [DISTWIDTH] IN BLOOD BY AUTOMATED COUNT: 12.1 % (ref 11.5–14.5)
EST. GFR  (AFRICAN AMERICAN): >60 ML/MIN/1.73 M^2
EST. GFR  (NON AFRICAN AMERICAN): >60 ML/MIN/1.73 M^2
GLUCOSE SERPL-MCNC: 95 MG/DL (ref 70–110)
GLUCOSE UR QL STRIP: NEGATIVE
HCT VFR BLD AUTO: 43.7 % (ref 40–54)
HGB BLD-MCNC: 13.2 G/DL (ref 14–18)
HGB UR QL STRIP: NEGATIVE
IMM GRANULOCYTES # BLD AUTO: 0.02 K/UL (ref 0–0.04)
IMM GRANULOCYTES NFR BLD AUTO: 0.3 % (ref 0–0.5)
KETONES UR QL STRIP: NEGATIVE
LEUKOCYTE ESTERASE UR QL STRIP: NEGATIVE
LIPASE SERPL-CCNC: 35 U/L (ref 4–60)
LYMPHOCYTES # BLD AUTO: 1.8 K/UL (ref 1–4.8)
LYMPHOCYTES NFR BLD: 31.7 % (ref 18–48)
MCH RBC QN AUTO: 28.1 PG (ref 27–31)
MCHC RBC AUTO-ENTMCNC: 30.2 G/DL (ref 32–36)
MCV RBC AUTO: 93 FL (ref 82–98)
MONOCYTES # BLD AUTO: 0.4 K/UL (ref 0.3–1)
MONOCYTES NFR BLD: 7.3 % (ref 4–15)
NEUTROPHILS # BLD AUTO: 3.4 K/UL (ref 1.8–7.7)
NEUTROPHILS NFR BLD: 58.6 % (ref 38–73)
NITRITE UR QL STRIP: NEGATIVE
NRBC BLD-RTO: 0 /100 WBC
PH UR STRIP: 7 [PH] (ref 5–8)
PLATELET # BLD AUTO: 165 K/UL (ref 150–450)
PMV BLD AUTO: 12.9 FL (ref 9.2–12.9)
POTASSIUM SERPL-SCNC: 3.8 MMOL/L (ref 3.5–5.1)
PROT SERPL-MCNC: 7.3 G/DL (ref 6–8.4)
PROT UR QL STRIP: NEGATIVE
RBC # BLD AUTO: 4.69 M/UL (ref 4.6–6.2)
SODIUM SERPL-SCNC: 137 MMOL/L (ref 136–145)
SP GR UR STRIP: 1.01 (ref 1–1.03)
T4 FREE SERPL-MCNC: 1.06 NG/DL (ref 0.71–1.51)
TROPONIN I SERPL DL<=0.01 NG/ML-MCNC: <0.006 NG/ML (ref 0–0.03)
TSH SERPL DL<=0.005 MIU/L-ACNC: 0.22 UIU/ML (ref 0.4–4)
URN SPEC COLLECT METH UR: NORMAL
WBC # BLD AUTO: 5.74 K/UL (ref 3.9–12.7)

## 2021-04-20 PROCEDURE — 80053 COMPREHEN METABOLIC PANEL: CPT | Performed by: PHYSICIAN ASSISTANT

## 2021-04-20 PROCEDURE — 99285 EMERGENCY DEPT VISIT HI MDM: CPT | Mod: 25

## 2021-04-20 PROCEDURE — 84439 ASSAY OF FREE THYROXINE: CPT | Performed by: PHYSICIAN ASSISTANT

## 2021-04-20 PROCEDURE — 93005 ELECTROCARDIOGRAM TRACING: CPT

## 2021-04-20 PROCEDURE — 84484 ASSAY OF TROPONIN QUANT: CPT | Performed by: PHYSICIAN ASSISTANT

## 2021-04-20 PROCEDURE — 84443 ASSAY THYROID STIM HORMONE: CPT | Performed by: PHYSICIAN ASSISTANT

## 2021-04-20 PROCEDURE — 99284 PR EMERGENCY DEPT VISIT,LEVEL IV: ICD-10-PCS | Mod: ,,, | Performed by: EMERGENCY MEDICINE

## 2021-04-20 PROCEDURE — 93010 ELECTROCARDIOGRAM REPORT: CPT | Mod: ,,, | Performed by: INTERNAL MEDICINE

## 2021-04-20 PROCEDURE — 83690 ASSAY OF LIPASE: CPT | Performed by: PHYSICIAN ASSISTANT

## 2021-04-20 PROCEDURE — 93010 EKG 12-LEAD: ICD-10-PCS | Mod: ,,, | Performed by: INTERNAL MEDICINE

## 2021-04-20 PROCEDURE — 81003 URINALYSIS AUTO W/O SCOPE: CPT | Performed by: PHYSICIAN ASSISTANT

## 2021-04-20 PROCEDURE — 85025 COMPLETE CBC W/AUTO DIFF WBC: CPT | Performed by: PHYSICIAN ASSISTANT

## 2021-04-20 PROCEDURE — 99284 EMERGENCY DEPT VISIT MOD MDM: CPT | Mod: ,,, | Performed by: EMERGENCY MEDICINE

## 2022-09-08 NOTE — PROGRESS NOTES
CC:   left hip pain  HPI:  26 y.o. yo male who presents with left hip pain.      Is a very pleasant 26-year-old male who sustained a hip injury in 2010 while playing football.  He states that the hip potentially popped out of joint and was immediately reduced which kept him out of playing football for quite some time.  Since that time he has had significant troubles with his left hip.  He has had significant pain.  Pain is sharp and stabbing in the groin.  He does not use an assistive device.  It hurts him most days.  He takes Norco 5 mg twice a day.    He states the hip pain was tolerable but significant prior to having his arthroscopic removal of loose body and labral repair in November 2016 by Dr. Delmer Osborn.  He states that Dr. Delmer Osborn saw many loose bodies was only able to remove 1 or 2 of these.  She states that he had a CAT scan in January 2017 which demonstrates more loose bodies.    He presents today for evaluation of the left hip.  He has some catching and locking in that left hip.  Pain is worse than before surgery.  It hurts him every day.  It awakes him from sleep at night.      He returns after seeing Dr. hooks who feels that hip arthroscopy is an option but not the greatest option in the world.  He could potentially benefit from removing the loose bodies however there is significant osteoarthritic change second due to the nature of his disease.  Dr. Hooks sent them back here for evaluation and discussion of hip resurfacing.  Rested in hip resurfacing as it is a major procedure.  He does know that this will be something in his future but at present he wants to consider hip scope versus steroid injection.    PAST MEDICAL HISTORY: History reviewed. No pertinent past medical history.  PAST SURGICAL HISTORY:   Past Surgical History:   Procedure Laterality Date    HIP SURGERY      KNEE ARTHROSCOPY       FAMILY HISTORY:   Family History   Problem Relation Age of Onset    No Known Problems  "Mother     Hypertension Father      SOCIAL HISTORY:   Social History     Social History    Marital status: Single     Spouse name: N/A    Number of children: N/A    Years of education: N/A     Occupational History    Not on file.     Social History Main Topics    Smoking status: Never Smoker    Smokeless tobacco: Not on file    Alcohol use Not on file    Drug use: Not on file    Sexual activity: Not on file     Other Topics Concern    Not on file     Social History Narrative       MEDICATIONS:   Current Outpatient Prescriptions:     hydrocodone-acetaminophen 5-325mg (NORCO) 5-325 mg per tablet, , Disp: , Rfl:     tizanidine (ZANAFLEX) 4 MG tablet, , Disp: , Rfl:   ALLERGIES:   Review of patient's allergies indicates:   Allergen Reactions    Valium [diazepam]        VITAL SIGNS:   Ht 6' 2" (1.88 m)  Wt 86.2 kg (190 lb 0.6 oz)  BMI 24.4 kg/m2     Review of Systems   Constitution: Negative. Negative for chills, fever and night sweats.   HENT: Negative for congestion and headaches.    Eyes: Negative for blurred vision, left vision loss and right vision loss.   Cardiovascular: Negative for chest pain and syncope.   Respiratory: Negative for cough and shortness of breath.    Endocrine: Negative for polydipsia, polyphagia and polyuria.   Hematologic/Lymphatic: Negative for bleeding problem. Does not bruise/bleed easily.   Skin: Negative for dry skin, itching and rash.   Musculoskeletal: Negative for falls and muscle weakness.  left hip pain  Gastrointestinal: Negative for abdominal pain and bowel incontinence.   Genitourinary: Negative for bladder incontinence and nocturia.   Neurological: Negative for disturbances in coordination, loss of balance and seizures.   Psychiatric/Behavioral: Negative for depression. The patient does not have insomnia.    Allergic/Immunologic: Negative for hives and persistent infections.       Physical Exam   Constitutional: Oriented to person, place, and time. Appears " well-developed and well-nourished.   HENT:   Head: Normocephalic and atraumatic.   Nose: Nose normal.   Eyes: No scleral icterus.   Neck: Normal range of motion. Neck supple.   Cardiovascular: Normal rate and regular rhythm.    Pulses:       Radial pulses are 2+ on the right side, and 2+ on the left side.   Pulmonary/Chest: Clear and normal  Abdominal: Soft.   Neurological: Alert and oriented to person, place, and time.   Skin: Skin is warm.   Psychiatric: Normal mood and affect.     He walks with an antalgic gait.  On examination of his left hip he has 90° of flexion with some pain he has 20-30° of internal rotation which causes pain at the extreme and he has 60° of external rotation which causes significant pain.  He has no tenderness palpation at the greater troches.  He does have pain with crossover of the hip.  Incisions which there are 3 are well-healed.  There is no redness warmth or erythema.  He is neurovascular intact in the left lower extremity.       Xrays:  Of the AP pelvis and bilateral hips are reviewed and my interpretation is as follows: On the right side he is very mild osteoarthritis.  On the left side he has degenerative changes which include joint space narrowing.  He is cyst throughout the hip.  There are loose bodies multiple.  He has a CAM lesion which appears to have been truncated by the arthroscopic intervention.    CT scan which is brought in on a disc is reviewed and my interpretation is as follows: Multiple loose bodies.  Multiple cysts in both the acetabulum and the femoral head.  Joint space narrowing.  Subchondral sclerosis.    Assessment:  Encounter Diagnoses   Name Primary?    Primary osteoarthritis of left hip Yes       Plan:         Return in about 3 months (around 7/24/2017).      This is a very young patient with significant hip problems.  I like to hold off on doing a hip resurfacing as long as possible.  I believe that he will benefit from steroid injections and avoiding  significant use of the joint.  I discussed this with Bebe Perez.    Acitretin Pregnancy And Lactation Text: This medication is Pregnancy Category X and should not be given to women who are pregnant or may become pregnant in the future. This medication is excreted in breast milk.

## (undated) DEVICE — DRAPE XRAY EQUIPMENT UNIV

## (undated) DEVICE — SUT BLU BR 2 TAPERD NDL 1/2

## (undated) DEVICE — WRAPON POLAR PAD HIP FOR POLAR

## (undated) DEVICE — SUT TIGERWIRE 2 26IN BLK WH

## (undated) DEVICE — GLOVE ORTHO PF SZ 8.5

## (undated) DEVICE — PACK HIP ARTHROSCOPY CUSTOM

## (undated) DEVICE — UNDERGLOVES BIOGEL PI SIZE 8.5

## (undated) DEVICE — STICK SWITCHING HIP PRESRVTN

## (undated) DEVICE — BLADE SHAVER PREBENT 4.2MM

## (undated) DEVICE — GAUZE SPONGE 4X4 12PLY

## (undated) DEVICE — GOWN SMARTGOWN LVL4 X-LONG XL

## (undated) DEVICE — PAD ELECTRODE STER 1.5X3

## (undated) DEVICE — POSITIONER IV ARMBOARD FOAM

## (undated) DEVICE — GLOVE BIOGEL SKINSENSE PI 8.0

## (undated) DEVICE — PAD ABD 8X10 STERILE

## (undated) DEVICE — DRAPE STERI-DRAPE 1000 17X11IN

## (undated) DEVICE — PAD SURGICAL KIT

## (undated) DEVICE — BRACE T-SCOPE HIP LEFT

## (undated) DEVICE — TAPE MEDIPORE 4IN X 2YDS

## (undated) DEVICE — SUT FIBERWIRE

## (undated) DEVICE — KIT PORTAL ENTRY

## (undated) DEVICE — GLOVE BIOGEL SKINSENSE PI 6.5

## (undated) DEVICE — CONTAINER SPECIMEN STRL 4OZ

## (undated) DEVICE — SUT ETHILON 3-0 PS2 18 BLK

## (undated) DEVICE — CANNULA FLOWPORT OBTURATOR

## (undated) DEVICE — SUT FIBERWIRE FIBER 2M

## (undated) DEVICE — GOWN SMART IMP BREATHABLE XXLG

## (undated) DEVICE — BLADE SHAVER ARTHRO 4.2X19CM

## (undated) DEVICE — DRAPE STERI INSTRUMENT 1018

## (undated) DEVICE — PROBE ARTHSCP EDGE ENERGY 50

## (undated) DEVICE — GLOVE BIOGEL SKINSENSE PI 7.0

## (undated) DEVICE — GOWN SURGICAL XX LARGE X LONG

## (undated) DEVICE — UNDERGLOVE BIOGEL PI SZ 6.5 LF

## (undated) DEVICE — SHAVER BUR PEAR 6MMX19CM

## (undated) DEVICE — SEE MEDLINE ITEM 152622

## (undated) DEVICE — GLOVE SURGEON SYN PF SZ 9

## (undated) DEVICE — SOL 9P NACL IRR PIC IL

## (undated) DEVICE — Device

## (undated) DEVICE — CANNULA TRIM IT CUS HIP

## (undated) DEVICE — DRESSING XEROFORM FOIL PK 1X8

## (undated) DEVICE — UNDERGLOVES BIOGEL PI SIZE 7.5

## (undated) DEVICE — BANDAID STRIP PLASTIC 3/4X3

## (undated) DEVICE — SLINGSHOT 70DEG UP

## (undated) DEVICE — TUBE SET INFLOW/OUTFLOW

## (undated) DEVICE — SOL IRR NACL .9% 3000ML

## (undated) DEVICE — BLADE BANANA 279MM 11IN DISP

## (undated) DEVICE — APPLICATOR CHLORAPREP ORN 26ML

## (undated) DEVICE — HDS DISPOSABLE PAD KIT